# Patient Record
Sex: FEMALE | Race: WHITE | Employment: OTHER | URBAN - METROPOLITAN AREA
[De-identification: names, ages, dates, MRNs, and addresses within clinical notes are randomized per-mention and may not be internally consistent; named-entity substitution may affect disease eponyms.]

---

## 2019-10-07 ENCOUNTER — PROCEDURE VISIT (OUTPATIENT)
Dept: SURGERY | Age: 69
End: 2019-10-07
Payer: MEDICARE

## 2019-10-07 VITALS
OXYGEN SATURATION: 99 % | WEIGHT: 104 LBS | SYSTOLIC BLOOD PRESSURE: 111 MMHG | DIASTOLIC BLOOD PRESSURE: 62 MMHG | HEART RATE: 72 BPM | TEMPERATURE: 98.6 F

## 2019-10-07 DIAGNOSIS — C44.41 BASAL CELL CARCINOMA OF LEFT SIDE OF NECK: Primary | ICD-10-CM

## 2019-10-07 PROCEDURE — 17311 MOHS 1 STAGE H/N/HF/G: CPT | Performed by: DERMATOLOGY

## 2019-10-07 PROCEDURE — 12042 INTMD RPR N-HF/GENIT2.6-7.5: CPT | Performed by: DERMATOLOGY

## 2019-10-08 ENCOUNTER — TELEPHONE (OUTPATIENT)
Dept: SURGERY | Age: 69
End: 2019-10-08

## 2020-02-03 ENCOUNTER — PROCEDURE VISIT (OUTPATIENT)
Dept: SURGERY | Age: 70
End: 2020-02-03
Payer: MEDICARE

## 2020-02-03 VITALS
WEIGHT: 104 LBS | HEART RATE: 86 BPM | DIASTOLIC BLOOD PRESSURE: 71 MMHG | TEMPERATURE: 97.8 F | OXYGEN SATURATION: 100 % | SYSTOLIC BLOOD PRESSURE: 111 MMHG

## 2020-02-03 PROCEDURE — 17311 MOHS 1 STAGE H/N/HF/G: CPT | Performed by: DERMATOLOGY

## 2020-02-03 PROCEDURE — 12052 INTMD RPR FACE/MM 2.6-5.0 CM: CPT | Performed by: DERMATOLOGY

## 2020-02-03 NOTE — PATIENT INSTRUCTIONS
[facility] Office Hours:    Monday-Thursday  7:30 AM-4:30 PM    Friday  9:00 AM-3:00 PM    POST-OPERATIVE CARE FOR DISSOLVING STICHES             Bandage change after 48 hours    During your procedure today, dissolving sutures, or stiches, were used to close the wound. You do not have to have stiches removed. They will dissolve (melt away) on their own. Please follow these instructions to help you recover from your procedure and help your wound heal.    CARING FOR YOUR SURGICAL SITE  The bandage should remain on and completley dry for 48 hours. Do NOT get the bandage wet. 1. After the first 48 hours, gently remove the remaining part of the bandage. It can be helpful to moisten the bandage edges in the shower. Steri strips may still be on the wound. It is ok, they will fall off slowly with the daily bandage changes. 2. Gently clean AROUND the wound daily with mild soap and water. Please avoid the area from getting wet. The more moisture is on the sutures the quicker they will dissolve. 3. Dry (pat) the area with a clean Q-tip or gauze. Try to clean off any debris or crust from the area. 4. Apply a layer of Vaseline/ Aquaphor (or Bacitracin if your doctor recommends) to the wound area only. 5. Cut a piece of Telfa (or any non-stick dressing) to fit just over the wound and secure it with paper tape. If the wound is small you may use a Band- Aid. Keep area covered for a total of 1 week(s). If the dressing comes off or if you have questions, or concerns about the dressing, please call the office for instructions! POST OPERATIVE INSTRUCTIONS    1. Activity: Do not lift anything heavier than a gallon of milk for 1 week. Also, avoid strenuous activity such as running, power walking or contact sports. 2. Eating and drinking: Do not drink alcohol for 48 hours after your procedure. Alcohol increases the chances of bleeding.   3. Medicines   -If you have discomfort, take Acetaminophen (Tylenol effective. Sunscreen IS necessary. Use at least and SPF 30 sunscreen daily- even in winter    Call us at [phone] right away if you have any of the following symptoms:  -Bleeding that you can not stop (see highlighted area above)   -Pain that lasts longer than 48 hours  -Your wound becomes  more painful, red or hot  -Bruising and swelling that does not begin to improve within the 48 hours or gets worse suddenly.

## 2020-02-04 ENCOUNTER — TELEPHONE (OUTPATIENT)
Dept: SURGERY | Age: 70
End: 2020-02-04

## 2020-02-04 NOTE — TELEPHONE ENCOUNTER
The patient was in the office on 2/3/20 for mohs surgery located on the right FH with [facility] repair. The patient tolerated the procedure well and left the office in good condition. Pain level on post-operative day 1:  none and level of pain (1-10, 10 severe), 0/10    Any bleeding episode that required pressure to be held, bandage change or a call to the office or MD?  no     Any other issues?:  no    A post-operative telephone call was placed at [phone] in order to check on the patient's recovery process. The patient reported doing well and had no complaints other than those listed above, if any. All of the patient's questions were answered.

## 2023-03-12 ENCOUNTER — HOSPITAL ENCOUNTER (EMERGENCY)
Age: 73
Discharge: HOME | End: 2023-03-12
Attending: EMERGENCY MEDICINE
Payer: MEDICARE

## 2023-03-12 ENCOUNTER — APPOINTMENT (OUTPATIENT)
Dept: CT IMAGING | Age: 73
End: 2023-03-12
Payer: MEDICARE

## 2023-03-12 ENCOUNTER — APPOINTMENT (OUTPATIENT)
Dept: GENERAL RADIOLOGY | Age: 73
End: 2023-03-12
Payer: MEDICARE

## 2023-03-12 VITALS
OXYGEN SATURATION: 100 % | SYSTOLIC BLOOD PRESSURE: 118 MMHG | WEIGHT: 103.4 LBS | HEIGHT: 64 IN | TEMPERATURE: 99 F | DIASTOLIC BLOOD PRESSURE: 65 MMHG | HEART RATE: 71 BPM | RESPIRATION RATE: 17 BRPM | BODY MASS INDEX: 17.65 KG/M2

## 2023-03-12 DIAGNOSIS — R07.9 CHEST PAIN, UNSPECIFIED TYPE: Primary | ICD-10-CM

## 2023-03-12 LAB
A/G RATIO: 1.8 (ref 1.1–2.2)
ALBUMIN SERPL-MCNC: 4.1 G/DL (ref 3.4–5)
ALP BLD-CCNC: 80 U/L (ref 40–129)
ALT SERPL-CCNC: 15 U/L (ref 10–40)
ANION GAP SERPL CALCULATED.3IONS-SCNC: 9 MMOL/L (ref 3–16)
AST SERPL-CCNC: 26 U/L (ref 15–37)
BASOPHILS ABSOLUTE: 0 K/UL (ref 0–0.2)
BASOPHILS RELATIVE PERCENT: 0.8 %
BILIRUB SERPL-MCNC: <0.2 MG/DL (ref 0–1)
BILIRUBIN URINE: NEGATIVE
BLOOD, URINE: ABNORMAL
BUN BLDV-MCNC: 16 MG/DL (ref 7–20)
CALCIUM SERPL-MCNC: 9.4 MG/DL (ref 8.3–10.6)
CHLORIDE BLD-SCNC: 104 MMOL/L (ref 99–110)
CLARITY: CLEAR
CO2: 27 MMOL/L (ref 21–32)
COLOR: YELLOW
CREAT SERPL-MCNC: 0.8 MG/DL (ref 0.6–1.2)
EOSINOPHILS ABSOLUTE: 0 K/UL (ref 0–0.6)
EOSINOPHILS RELATIVE PERCENT: 0.6 %
EPITHELIAL CELLS, UA: ABNORMAL /HPF (ref 0–5)
GFR SERPL CREATININE-BSD FRML MDRD: >60 ML/MIN/{1.73_M2}
GLUCOSE BLD-MCNC: 95 MG/DL (ref 70–99)
GLUCOSE URINE: NEGATIVE MG/DL
HCT VFR BLD CALC: 38.6 % (ref 36–48)
HEMOGLOBIN: 12.9 G/DL (ref 12–16)
KETONES, URINE: NEGATIVE MG/DL
LEUKOCYTE ESTERASE, URINE: NEGATIVE
LYMPHOCYTES ABSOLUTE: 1.9 K/UL (ref 1–5.1)
LYMPHOCYTES RELATIVE PERCENT: 36.1 %
MCH RBC QN AUTO: 30.2 PG (ref 26–34)
MCHC RBC AUTO-ENTMCNC: 33.4 G/DL (ref 31–36)
MCV RBC AUTO: 90.4 FL (ref 80–100)
MICROSCOPIC EXAMINATION: YES
MONOCYTES ABSOLUTE: 0.4 K/UL (ref 0–1.3)
MONOCYTES RELATIVE PERCENT: 7.9 %
MUCUS: ABNORMAL /LPF
NEUTROPHILS ABSOLUTE: 2.9 K/UL (ref 1.7–7.7)
NEUTROPHILS RELATIVE PERCENT: 54.6 %
NITRITE, URINE: NEGATIVE
PDW BLD-RTO: 13.6 % (ref 12.4–15.4)
PH UA: 6 (ref 5–8)
PLATELET # BLD: 283 K/UL (ref 135–450)
PMV BLD AUTO: 7.6 FL (ref 5–10.5)
POTASSIUM REFLEX MAGNESIUM: 4 MMOL/L (ref 3.5–5.1)
PROTEIN UA: NEGATIVE MG/DL
RBC # BLD: 4.27 M/UL (ref 4–5.2)
RBC UA: ABNORMAL /HPF (ref 0–4)
SODIUM BLD-SCNC: 140 MMOL/L (ref 136–145)
SPECIFIC GRAVITY UA: 1.02 (ref 1–1.03)
TOTAL PROTEIN: 6.4 G/DL (ref 6.4–8.2)
TROPONIN: <0.01 NG/ML
URINE REFLEX TO CULTURE: ABNORMAL
URINE TYPE: ABNORMAL
UROBILINOGEN, URINE: 0.2 E.U./DL
WBC # BLD: 5.4 K/UL (ref 4–11)
WBC UA: ABNORMAL /HPF (ref 0–5)

## 2023-03-12 PROCEDURE — 81001 URINALYSIS AUTO W/SCOPE: CPT

## 2023-03-12 PROCEDURE — 71046 X-RAY EXAM CHEST 2 VIEWS: CPT

## 2023-03-12 PROCEDURE — 36415 COLL VENOUS BLD VENIPUNCTURE: CPT

## 2023-03-12 PROCEDURE — 93005 ELECTROCARDIOGRAM TRACING: CPT | Performed by: EMERGENCY MEDICINE

## 2023-03-12 PROCEDURE — 80053 COMPREHEN METABOLIC PANEL: CPT

## 2023-03-12 PROCEDURE — 85025 COMPLETE CBC W/AUTO DIFF WBC: CPT

## 2023-03-12 PROCEDURE — 70450 CT HEAD/BRAIN W/O DYE: CPT

## 2023-03-12 PROCEDURE — 99284 EMERGENCY DEPT VISIT MOD MDM: CPT

## 2023-03-12 PROCEDURE — 84484 ASSAY OF TROPONIN QUANT: CPT

## 2023-03-12 ASSESSMENT — PAIN DESCRIPTION - LOCATION: LOCATION: CHEST

## 2023-03-12 ASSESSMENT — PAIN SCALES - GENERAL: PAINLEVEL_OUTOF10: 3

## 2023-03-12 ASSESSMENT — PAIN DESCRIPTION - DESCRIPTORS: DESCRIPTORS: DULL;DISCOMFORT

## 2023-03-12 NOTE — DISCHARGE INSTRUCTIONS
Today, you are seen for chest pain. Your blood work was all reassuring. Since you have been having some fuzzy feeling in your head, we also decided to do a CAT scan which did not show any abnormalities. I am going to have you follow-up with your primary care physician and with cardiology. Please return to the emergency department immediately if you have any new or worsening symptoms. This will include worsening chest pain, shortness of breath, epigastric discomfort.

## 2023-03-12 NOTE — ED PROVIDER NOTES
[address] [facility]          ATTENDING PHYSICIAN NOTE       Date of evaluation: 3/12/2023    Chief Complaint     Chest Pain (For about 15 minutes last night accompanied by some dizziness, today c/o fuzzy feeling in head and slight headache)      History of Present Illness     Milana Ramos is a 68 y.o. female with a past medical history of diverticulitis, asthma, IBS who presents to the emergency department today with chest pain and lightheadedness. Yesterday, she was standing up when she had a sharp pain in her left chest that lasted for approximately 15 minutes. She also felt lightheaded for couple of minutes before that resolved. Her symptoms improved when she laid down. Today, she has a new headache in her occipital region, along with a \"fuzzy feeling\" in her head. She has been eating and drinking normally. She is voiding normally and having normal bowel movements. No shortness of breath. No diaphoresis. No photo or phonophobia. Review of Systems     As documented in HPI, otherwise all other systems were reviewed and negative    Past Medical, Surgical, Family, and Social History     She has no past medical history on file. She has a past surgical history that includes Mohs surgery (Right, 02/03/2020). Her family history is not on file. She reports that she has never smoked. She has never used smokeless tobacco. She reports that she does not use drugs. Medications     Previous Medications    CALCIUM-VITAMIN D-VITAMIN K 500-100-40 MG-UNT-MCG CHEW    Take by mouth    MULTIPLE VITAMINS-MINERALS (MULTIVITAMIN PO)    Take by mouth       Allergies     She has No Known Allergies.     Physical Exam     INITIAL VITALS: BP: (!) 140/64, Temp: 99 °F (37.2 °C), Heart Rate: 72, Resp: 18, SpO2: 100 %   General: Well-appearing, no acute distress  HEENT: head is atraumatic, pupils equal round and reactive to light, sclera are clear  Neck: Supple  Chest: Nonlabored respirations, equal chest rise bilaterally, no accessory muscle use  Cardiovascular: Normal rate, regular rhythm, 2+ radial pulses bilaterally  Abdominal: Soft, nontender, nondistended, no rebound or guarding  Back: No CVA tenderness bilaterally  Skin: Warm, dry, well-perfused, no rashes  Musculoskeletal: No obvious deformities, no tenderness to palpation diffusely  Neurologic: Alert and oriented x4, speech is clear and intact without dysarthria. Cranial nerves II through XII are intact. Finger-nose, rapid alternating movements, heel-to-shin are intact. Muscle strength is 5/5 in bilateral upper and lower extremities. Sensation is intact in bilateral upper and lower extremities. Romberg is negative. Psych: Appropriate mood and affect  Diagnostic Results     EKG   Twelve-lead EKG performed on 3/12/2023 at 1428 hrs. Sinus rhythm with a first-degree heart block at a rate of 72. Normal axis. Good R wave progression. No significant Q waves noted. No ST segment changes noted. No T wave inversions noted. QTc normal at 431. QRS normal 80. IL interval prolonged at 222. No STEMI. RADIOLOGY:  CT Head W/O Contrast   Final Result   1. No acute intracranial abnormality. XR CHEST (2 VW)   Final Result   1. No acute disease.           LABS:   Results for orders placed or performed during the hospital encounter of 03/12/23   CBC with Auto Differential   Result Value Ref Range    WBC 5.4 4.0 - 11.0 K/uL    RBC 4.27 4.00 - 5.20 M/uL    Hemoglobin 12.9 12.0 - 16.0 g/dL    Hematocrit 38.6 36.0 - 48.0 %    MCV 90.4 80.0 - 100.0 fL    MCH 30.2 26.0 - 34.0 pg    MCHC 33.4 31.0 - 36.0 g/dL    RDW 13.6 12.4 - 15.4 %    Platelets 637 131 - 155 K/uL    MPV 7.6 5.0 - 10.5 fL    Neutrophils % 54.6 %    Lymphocytes % 36.1 %    Monocytes % 7.9 %    Eosinophils % 0.6 %    Basophils % 0.8 %    Neutrophils Absolute 2.9 1.7 - 7.7 K/uL    Lymphocytes Absolute 1.9 1.0 - 5.1 K/uL    Monocytes Absolute 0.4 0.0 - 1.3 K/uL    Eosinophils Absolute 0.0 0.0 - 0.6 K/uL    Basophils Absolute 0.0 0.0 - 0.2 K/uL   CMP w/ Reflex to MG   Result Value Ref Range    Sodium 140 136 - 145 mmol/L    Potassium reflex Magnesium 4.0 3.5 - 5.1 mmol/L    Chloride 104 99 - 110 mmol/L    CO2 27 21 - 32 mmol/L    Anion Gap 9 3 - 16    Glucose 95 70 - 99 mg/dL    BUN 16 7 - 20 mg/dL    Creatinine 0.8 0.6 - 1.2 mg/dL    Est, Glom Filt Rate >60 >60    Calcium 9.4 8.3 - 10.6 mg/dL    Total Protein 6.4 6.4 - 8.2 g/dL    Albumin 4.1 3.4 - 5.0 g/dL    Albumin/Globulin Ratio 1.8 1.1 - 2.2    Total Bilirubin <0.2 0.0 - 1.0 mg/dL    Alkaline Phosphatase 80 40 - 129 U/L    ALT 15 10 - 40 U/L    AST 26 15 - 37 U/L   Troponin   Result Value Ref Range    Troponin <0.01 <0.01 ng/mL   Urinalysis with Reflex to Culture    Specimen: Urine   Result Value Ref Range    Color, UA Yellow Straw/Yellow    Clarity, UA Clear Clear    Glucose, Ur Negative Negative mg/dL    Bilirubin Urine Negative Negative    Ketones, Urine Negative Negative mg/dL    Specific Gravity, UA 1.025 1.005 - 1.030    Blood, Urine TRACE-INTACT (A) Negative    pH, UA 6.0 5.0 - 8.0    Protein, UA Negative Negative mg/dL    Urobilinogen, Urine 0.2 <2.0 E.U./dL    Nitrite, Urine Negative Negative    Leukocyte Esterase, Urine Negative Negative    Microscopic Examination YES     Urine Type NotGiven     Urine Reflex to Culture Not Indicated    Microscopic Urinalysis   Result Value Ref Range    Mucus, UA Rare (A) None Seen /LPF    WBC, UA 0-2 0 - 5 /HPF    RBC, UA 5-10 (A) 0 - 4 /HPF    Epithelial Cells, UA 0-1 0 - 5 /HPF       ED BEDSIDE ULTRASOUND:  No results found. RECENT VITALS:  BP: 118/65,Temp: 99 °F (37.2 °C), Heart Rate: 71, Resp: 17, SpO2: 100 %     Procedures     N/A    ED Course     Nursing Notes, Past Medical Hx, Past Surgical Hx, Social Hx,Allergies, and Family Hx were reviewed. patient was given the following medications:  No orders of the defined types were placed in this encounter.       CONSULTS:  None    MEDICAL DECISIONMAKING / ASSESSMENT / Humairavilla Dykes  Maria Hanley is a 68 y.o. female with a past medical history of diverticulitis, asthma, IBS who presents to the emergency department today with chest pain and lightheadedness. She arrives to the emergency department her ABCs intact. She is hypertensive, vitals are otherwise within normal limits. Her exam is reassuring. Neuro exam is reassuring. EKG negative for STEMI or arrhythmia. CT of the head/brain without any acute abnormalities. X-ray of the chest without acute abnormalities. Her blood work, including troponin is all reassuring. UA is negative for UTI. On repeat evaluation, she feels better. She does not have any pain currently. She has a heart score of 3. Due to her work-up today, I think she can be safely discharged home. She should follow-up with her primary care physician and cardiology. I have given her the information for this. She can return to the emergency department immediately with new or worsening symptoms. I discussed this with the patient who verbalized understanding and agreement to the plan. She is discharged from the emergency department in stable condition. Amount and/or Complexity of Data Reviewed  External Data Reviewed: notes. Labs: ordered. Decision-making details documented in ED Course. Radiology: ordered. Decision-making details documented in ED Course. ECG/medicine tests: ordered and independent interpretation performed. Decision-making details documented in ED Course. Clinical Impression     1. Chest pain, unspecified type        Disposition     PATIENT REFERRED TO:  [id number]  [place] [address] [phone]    Schedule an appointment as soon as possible for a visit       Noel Armstrongiredo [facility]  [address]  [address] [address] [facility]  Schedule an appointment as soon as possible for a visit       DISCHARGE MEDICATIONS:  New Prescriptions    No medications on file       DISPOSITION Decision To Discharge 03/12/2023 06:23:03 PM         aKylah Carter MD  03/12/23 5278

## 2023-03-12 NOTE — ED NOTES
Patient discharged to home via family. Written discharge instructions reviewed with understanding. Copy of AVS sent home with patient. Patient able to walk from ED without assistance.         Lorrie Fisher RN  03/12/23 5604

## 2023-03-13 LAB
EKG ATRIAL RATE: 72 BPM
EKG DIAGNOSIS: NORMAL
EKG P AXIS: 83 DEGREES
EKG P-R INTERVAL: 222 MS
EKG Q-T INTERVAL: 394 MS
EKG QRS DURATION: 80 MS
EKG QTC CALCULATION (BAZETT): 431 MS
EKG R AXIS: 86 DEGREES
EKG T AXIS: 70 DEGREES
EKG VENTRICULAR RATE: 72 BPM

## 2023-03-16 NOTE — PROGRESS NOTES
[facility]     Outpatient Cardiology         Patient Name:  Prisca Board  Requesting Physician: No admitting provider for patient encounter. Primary Care Physician: Hal Barrett    Reason for Consultation/Chief Complaint:   Chief Complaint   Patient presents with    Chest Pain     More below the breast across    Fatigue       HPI:     68year old female seen in [place] [address] ED 3/12/23 for chest pain presents for cardiac evaluation. She did not present to the ER until the day after she experiences chest pain. When she stood up she got warm and lightheaded and almost blacked out. Chest pain lasted for 15-20 minutes and then the pain gradually subsided. She has new c/o fatigue, headache, ringing in her ears, and states she feels\"foggy\". She said these symptoms also began around the same time she began experiencing chest pain. Histories:     Past Medical History:   has no past medical history on file. Surgical History:   has a past surgical history that includes Mohs surgery (Right, 02/03/2020). Social History:   reports that she has never smoked. She has never used smokeless tobacco. She reports that she does not use drugs. Family History:  No evidence for sudden cardiac death or premature CAD    Medications:     Home Medications:  Were reviewed and are listed in nursing record. and/or listed below    Prior to Admission medications    Medication Sig Start Date End Date Taking? Authorizing Provider   Multiple Vitamins-Minerals (MULTIVITAMIN PO) Take by mouth   Yes Historical Provider, MD   Calcium-Vitamin D-Vitamin K 500-100-40 MG-UNT-MCG CHEW Take by mouth   Yes Historical Provider, MD        Allergy:     Patient has no known allergies.      Review of Systems:     Review of Systems    Physical Examination:     Vitals:    03/17/23 0945   BP: 132/74   Site: Right Upper Arm   Position: Sitting   Cuff Size: Medium Adult   Pulse: 54   Weight: 100 lb 9.6 oz (45.6 kg)   Height: 5' 4\" (1.626 m)       Wt Readings from Last 3 Encounters:   03/17/23 100 lb 9.6 oz (45.6 kg)   03/12/23 103 lb 6.3 oz (46.9 kg)   02/03/20 104 lb (47.2 kg)       Physical Exam  Constitutional:       Appearance: Normal appearance. HENT:      Head: Normocephalic and atraumatic. Nose: Nose normal.   Eyes:      Conjunctiva/sclera: Conjunctivae normal.   Cardiovascular:      Rate and Rhythm: Normal rate and regular rhythm. Heart sounds: Normal heart sounds. Pulmonary:      Effort: Pulmonary effort is normal.      Breath sounds: Normal breath sounds. Abdominal:      Palpations: Abdomen is soft. Musculoskeletal:      Cervical back: Neck supple. Skin:     General: Skin is warm and dry. Neurological:      General: No focal deficit present. Mental Status: She is alert.          Labs:     Lab Results   Component Value Date    WBC 5.4 03/12/2023    HGB 12.9 03/12/2023    HCT 38.6 03/12/2023    MCV 90.4 03/12/2023     03/12/2023     Lab Results   Component Value Date     03/12/2023    K 4.0 03/12/2023     03/12/2023    CO2 27 03/12/2023    BUN 16 03/12/2023    CREATININE 0.8 03/12/2023    GLUCOSE 95 03/12/2023    CALCIUM 9.4 03/12/2023    PROT 6.4 03/12/2023    LABALBU 4.1 03/12/2023    BILITOT <0.2 03/12/2023    ALKPHOS 80 03/12/2023    AST 26 03/12/2023    ALT 15 03/12/2023    LABGLOM >60 03/12/2023    AGRATIO 1.8 03/12/2023         No results found for: CHOL  No results found for: TRIG  No results found for: HDL  No results found for: LDLCHOLESTEROL, LDLCALC  No results found for: LABVLDL, VLDL  No results found for: CHOLHDLRATIO    No results found for: INR, PROTIME    The ASCVD Risk score ([place] DK, et al., 2019) failed to calculate for the following reasons:    Cannot find a previous HDL lab    Cannot find a previous total cholesterol lab      Imaging:       ECG (if available, Personally interpreted):    Last Monitor/Holter (if available):    Last Stress (if available):    Last Cath (if available):    Last TTE/SUZI(if available):    Last CMR  (if available):    Last Coronary Artery Calcium Score: Ankle-brachial index:    Carotid ultrasound screening:    Abdominal aortic aneurysm screening:    Assessment / Plan:     1. Chest pain, unspecified type    2. Dizziness    3. Fatigue, unspecified type      Discussed lifestyle modifications. Echocardiogram to exclude significant structural abnormalities and noninvasive ischemic assessment. 7-day CAM to evaluate for significant arrhythmias. And to follow-up with PCP for ringing in her ears, headaches and fogginess  RTC 1 month      Orders Placed This Encounter   Procedures    Cardiac event monitor    Stress test, myoview    EKG 12 Lead    Echo 2D w doppler w color complete        No follow-ups on file. The note was completed using EMR and Dragon dictation system. Every effort was made to ensure accuracy; however, inadvertent computerized transcription errors may be present. All questions and concerns were addressed to the patient. I would like to thank you for providing me the opportunity to participate in the care of your patient. If you have any questions, please do not hesitate to contact me. Magdi Hernandez MD, [facility] [address] [place] [address]  Main Office Phone: [phone]  Fax: [phone]    I, Rashmi Lockett RN, am scribing for and in the presence of Dr. Magdi Hernandez. 03/17/23 10:53 AM  Rashmi Lockett RN    Physician Attestation:  The scribes documentation has been prepared under my direction and personally reviewed by me in its entirety. I confirm the note above accurately reflects all work, treatment, procedures, and medical decision making performed by me.     Electronically signed by Magdi Hernandez MD on 3/17/2023 at 10:53 AM

## 2023-03-17 ENCOUNTER — OFFICE VISIT (OUTPATIENT)
Dept: CARDIOLOGY CLINIC | Age: 73
End: 2023-03-17

## 2023-03-17 VITALS
HEIGHT: 64 IN | DIASTOLIC BLOOD PRESSURE: 74 MMHG | WEIGHT: 100.6 LBS | SYSTOLIC BLOOD PRESSURE: 132 MMHG | BODY MASS INDEX: 17.17 KG/M2 | HEART RATE: 54 BPM

## 2023-03-17 DIAGNOSIS — R53.83 FATIGUE, UNSPECIFIED TYPE: ICD-10-CM

## 2023-03-17 DIAGNOSIS — R07.9 CHEST PAIN, UNSPECIFIED TYPE: Primary | ICD-10-CM

## 2023-03-17 DIAGNOSIS — R42 DIZZINESS: ICD-10-CM

## 2023-03-17 PROCEDURE — 93242 EXT ECG>48HR<7D RECORDING: CPT | Performed by: INTERNAL MEDICINE

## 2023-03-17 NOTE — PATIENT INSTRUCTIONS
Schedule Echocardiogram and Stress Test,  Wear you 7 day CAM - return to office when completed   Change positions slowly to help prevent passing out  Follow up in 1 month

## 2023-03-31 ENCOUNTER — HOSPITAL ENCOUNTER (OUTPATIENT)
Dept: NON INVASIVE DIAGNOSTICS | Age: 73
Discharge: HOME | End: 2023-03-31
Payer: MEDICARE

## 2023-03-31 DIAGNOSIS — R42 DIZZINESS: ICD-10-CM

## 2023-03-31 DIAGNOSIS — R07.9 CHEST PAIN, UNSPECIFIED TYPE: ICD-10-CM

## 2023-03-31 DIAGNOSIS — R53.83 FATIGUE, UNSPECIFIED TYPE: ICD-10-CM

## 2023-03-31 PROCEDURE — 3430000000 HC RX DIAGNOSTIC RADIOPHARMACEUTICAL: Performed by: INTERNAL MEDICINE

## 2023-03-31 PROCEDURE — A9502 TC99M TETROFOSMIN: HCPCS | Performed by: INTERNAL MEDICINE

## 2023-03-31 PROCEDURE — 78452 HT MUSCLE IMAGE SPECT MULT: CPT

## 2023-03-31 RX ADMIN — TETROFOSMIN 30 MILLICURIE: 1.38 INJECTION, POWDER, LYOPHILIZED, FOR SOLUTION INTRAVENOUS at 14:40

## 2023-03-31 RX ADMIN — TETROFOSMIN 10 MILLICURIE: 1.38 INJECTION, POWDER, LYOPHILIZED, FOR SOLUTION INTRAVENOUS at 13:10

## 2023-04-04 PROCEDURE — 93244 EXT ECG>48HR<7D REV&INTERPJ: CPT | Performed by: INTERNAL MEDICINE

## 2023-04-05 ENCOUNTER — TELEPHONE (OUTPATIENT)
Dept: CARDIOLOGY CLINIC | Age: 73
End: 2023-04-05

## 2023-04-05 DIAGNOSIS — R42 DIZZINESS: ICD-10-CM

## 2023-04-05 DIAGNOSIS — R00.2 PALPITATION: Primary | ICD-10-CM

## 2023-04-05 DIAGNOSIS — R53.83 FATIGUE, UNSPECIFIED TYPE: ICD-10-CM

## 2023-04-05 DIAGNOSIS — R07.9 CHEST PAIN, UNSPECIFIED TYPE: ICD-10-CM

## 2023-04-05 NOTE — TELEPHONE ENCOUNTER
Patient returned a missed call from someone pertaining to stress test results.   Call back [phone]

## 2023-04-14 PROBLEM — R53.83 FATIGUE: Status: ACTIVE | Noted: 2023-04-14

## 2023-04-14 PROBLEM — R42 DIZZINESS: Status: ACTIVE | Noted: 2023-04-14

## 2023-04-14 PROBLEM — R07.9 CHEST PAIN: Status: ACTIVE | Noted: 2023-04-14

## 2024-05-08 ENCOUNTER — HOSPITAL ENCOUNTER (EMERGENCY)
Age: 74
Discharge: HOME | End: 2024-05-08
Attending: EMERGENCY MEDICINE
Payer: MEDICARE

## 2024-05-08 ENCOUNTER — APPOINTMENT (OUTPATIENT)
Dept: GENERAL RADIOLOGY | Age: 74
End: 2024-05-08
Payer: MEDICARE

## 2024-05-08 VITALS
DIASTOLIC BLOOD PRESSURE: 67 MMHG | OXYGEN SATURATION: 95 % | BODY MASS INDEX: 22.23 KG/M2 | SYSTOLIC BLOOD PRESSURE: 102 MMHG | RESPIRATION RATE: 20 BRPM | TEMPERATURE: 98.4 F | WEIGHT: 130.2 LBS | HEART RATE: 100 BPM | HEIGHT: 64 IN

## 2024-05-08 DIAGNOSIS — J18.9 COMMUNITY ACQUIRED PNEUMONIA OF RIGHT LOWER LOBE OF LUNG: Primary | ICD-10-CM

## 2024-05-08 PROBLEM — R05.3 CHRONIC COUGH: Status: ACTIVE | Noted: 2024-03-18

## 2024-05-08 PROBLEM — L90.5 SCAR CONDITIONS AND FIBROSIS OF SKIN: Status: ACTIVE | Noted: 2021-09-27

## 2024-05-08 PROBLEM — K58.9 IRRITABLE BOWEL SYNDROME: Status: ACTIVE | Noted: 2024-05-08

## 2024-05-08 PROBLEM — E55.9 VITAMIN D DEFICIENCY: Status: ACTIVE | Noted: 2024-05-08

## 2024-05-08 LAB
ANION GAP SERPL CALCULATED.3IONS-SCNC: 10 MMOL/L (ref 3–16)
BASE EXCESS BLDV CALC-SCNC: 4.6 MMOL/L (ref -2–3)
BASOPHILS # BLD: 0.1 K/UL (ref 0–0.2)
BASOPHILS NFR BLD: 0.5 %
BUN SERPL-MCNC: 9 MG/DL (ref 7–20)
CALCIUM SERPL-MCNC: 9.1 MG/DL (ref 8.3–10.6)
CHLORIDE SERPL-SCNC: 99 MMOL/L (ref 99–110)
CO2 BLDV-SCNC: 31 MMOL/L
CO2 SERPL-SCNC: 26 MMOL/L (ref 21–32)
COHGB MFR BLDV: 1.1 % (ref 0–1.5)
CREAT SERPL-MCNC: 0.7 MG/DL (ref 0.6–1.2)
DEPRECATED RDW RBC AUTO: 14.2 % (ref 12.4–15.4)
DO-HGB MFR BLDV: 62.5 %
EKG ATRIAL RATE: 90 BPM
EKG DIAGNOSIS: NORMAL
EKG P AXIS: 71 DEGREES
EKG P-R INTERVAL: 152 MS
EKG Q-T INTERVAL: 348 MS
EKG QRS DURATION: 80 MS
EKG QTC CALCULATION (BAZETT): 425 MS
EKG R AXIS: 78 DEGREES
EKG T AXIS: 55 DEGREES
EKG VENTRICULAR RATE: 90 BPM
EOSINOPHIL # BLD: 0 K/UL (ref 0–0.6)
EOSINOPHIL NFR BLD: 0.2 %
GFR SERPLBLD CREATININE-BSD FMLA CKD-EPI: >90 ML/MIN/{1.73_M2}
GLUCOSE SERPL-MCNC: 119 MG/DL (ref 70–99)
HCO3 BLDV-SCNC: 29.5 MMOL/L (ref 24–28)
HCT VFR BLD AUTO: 31 % (ref 36–48)
HGB BLD-MCNC: 10.2 G/DL (ref 12–16)
LYMPHOCYTES # BLD: 1.5 K/UL (ref 1–5.1)
LYMPHOCYTES NFR BLD: 11.2 %
MAGNESIUM SERPL-MCNC: 2.4 MG/DL (ref 1.8–2.4)
MCH RBC QN AUTO: 29.1 PG (ref 26–34)
MCHC RBC AUTO-ENTMCNC: 33 G/DL (ref 31–36)
MCV RBC AUTO: 88.3 FL (ref 80–100)
METHGB MFR BLDV: 0.1 % (ref 0–1.5)
MONOCYTES # BLD: 0.6 K/UL (ref 0–1.3)
MONOCYTES NFR BLD: 4.3 %
NEUTROPHILS # BLD: 10.9 K/UL (ref 1.7–7.7)
NEUTROPHILS NFR BLD: 83.8 %
PCO2 BLDV: 44.7 MMHG (ref 41–51)
PH BLDV: 7.43 [PH] (ref 7.35–7.45)
PLATELET # BLD AUTO: 414 K/UL (ref 135–450)
PMV BLD AUTO: 7.8 FL (ref 5–10.5)
PO2 BLDV: <30 MMHG (ref 25–40)
POTASSIUM SERPL-SCNC: 4.2 MMOL/L (ref 3.5–5.1)
PROCALCITONIN SERPL IA-MCNC: 0.27 NG/ML (ref 0–0.15)
RBC # BLD AUTO: 3.51 M/UL (ref 4–5.2)
SAO2 % BLDV: 37 %
SODIUM SERPL-SCNC: 135 MMOL/L (ref 136–145)
WBC # BLD AUTO: 13.1 K/UL (ref 4–11)

## 2024-05-08 PROCEDURE — 80048 BASIC METABOLIC PNL TOTAL CA: CPT

## 2024-05-08 PROCEDURE — 93005 ELECTROCARDIOGRAM TRACING: CPT | Performed by: EMERGENCY MEDICINE

## 2024-05-08 PROCEDURE — 96374 THER/PROPH/DIAG INJ IV PUSH: CPT

## 2024-05-08 PROCEDURE — 82803 BLOOD GASES ANY COMBINATION: CPT

## 2024-05-08 PROCEDURE — 99285 EMERGENCY DEPT VISIT HI MDM: CPT

## 2024-05-08 PROCEDURE — 2580000003 HC RX 258: Performed by: PHYSICIAN ASSISTANT

## 2024-05-08 PROCEDURE — 6370000000 HC RX 637 (ALT 250 FOR IP): Performed by: PHYSICIAN ASSISTANT

## 2024-05-08 PROCEDURE — 84145 PROCALCITONIN (PCT): CPT

## 2024-05-08 PROCEDURE — 85025 COMPLETE CBC W/AUTO DIFF WBC: CPT

## 2024-05-08 PROCEDURE — 6360000002 HC RX W HCPCS: Performed by: PHYSICIAN ASSISTANT

## 2024-05-08 PROCEDURE — 83735 ASSAY OF MAGNESIUM: CPT

## 2024-05-08 PROCEDURE — 71045 X-RAY EXAM CHEST 1 VIEW: CPT

## 2024-05-08 RX ORDER — BENZONATATE 200 MG/1
200 CAPSULE ORAL 3 TIMES DAILY PRN
Qty: 45 CAPSULE | Refills: 0 | Status: SHIPPED | OUTPATIENT
Start: 2024-05-08

## 2024-05-08 RX ORDER — BENZONATATE 100 MG/1
200 CAPSULE ORAL 3 TIMES DAILY PRN
Status: DISCONTINUED | OUTPATIENT
Start: 2024-05-08 | End: 2024-05-08 | Stop reason: HOSPADM

## 2024-05-08 RX ORDER — AZITHROMYCIN 250 MG/1
500 TABLET, FILM COATED ORAL ONCE
Status: COMPLETED | OUTPATIENT
Start: 2024-05-08 | End: 2024-05-08

## 2024-05-08 RX ORDER — GUAIFENESIN/DEXTROMETHORPHAN 100-10MG/5
5 SYRUP ORAL EVERY 4 HOURS PRN
Status: DISCONTINUED | OUTPATIENT
Start: 2024-05-08 | End: 2024-05-08 | Stop reason: HOSPADM

## 2024-05-08 RX ORDER — AMOXICILLIN AND CLAVULANATE POTASSIUM 875; 125 MG/1; MG/1
1 TABLET, FILM COATED ORAL 2 TIMES DAILY
Qty: 20 TABLET | Refills: 0 | Status: SHIPPED | OUTPATIENT
Start: 2024-05-08 | End: 2024-05-18

## 2024-05-08 RX ORDER — DICLOFENAC SODIUM 75 MG/1
75 TABLET, DELAYED RELEASE ORAL 2 TIMES DAILY
COMMUNITY
Start: 2024-02-08

## 2024-05-08 RX ORDER — AZITHROMYCIN 250 MG/1
250 TABLET, FILM COATED ORAL DAILY
Qty: 4 TABLET | Refills: 0 | Status: SHIPPED | OUTPATIENT
Start: 2024-05-08 | End: 2024-05-12

## 2024-05-08 RX ORDER — AMOXICILLIN AND CLAVULANATE POTASSIUM 875; 125 MG/1; MG/1
1 TABLET, FILM COATED ORAL ONCE
Status: COMPLETED | OUTPATIENT
Start: 2024-05-08 | End: 2024-05-08

## 2024-05-08 RX ORDER — FAMOTIDINE 20 MG/1
20 TABLET, FILM COATED ORAL 2 TIMES DAILY
COMMUNITY
Start: 2023-08-07

## 2024-05-08 RX ORDER — FLUTICASONE FUROATE AND VILANTEROL 200; 25 UG/1; UG/1
1 POWDER RESPIRATORY (INHALATION) DAILY
COMMUNITY
Start: 2024-05-01

## 2024-05-08 RX ADMIN — BENZONATATE 200 MG: 100 CAPSULE ORAL at 02:00

## 2024-05-08 RX ADMIN — GUAIFENESIN SYRUP AND DEXTROMETHORPHAN 5 ML: 100; 10 SYRUP ORAL at 02:00

## 2024-05-08 RX ADMIN — WATER 125 MG: 1 INJECTION INTRAMUSCULAR; INTRAVENOUS; SUBCUTANEOUS at 02:07

## 2024-05-08 RX ADMIN — AZITHROMYCIN DIHYDRATE 500 MG: 250 TABLET, FILM COATED ORAL at 02:49

## 2024-05-08 RX ADMIN — AMOXICILLIN AND CLAVULANATE POTASSIUM 1 TABLET: 875; 125 TABLET, FILM COATED ORAL at 02:49

## 2024-05-08 ASSESSMENT — PAIN DESCRIPTION - LOCATION: LOCATION: RIB CAGE

## 2024-05-08 ASSESSMENT — LIFESTYLE VARIABLES
HOW MANY STANDARD DRINKS CONTAINING ALCOHOL DO YOU HAVE ON A TYPICAL DAY: PATIENT DOES NOT DRINK
HOW OFTEN DO YOU HAVE A DRINK CONTAINING ALCOHOL: NEVER

## 2024-05-08 ASSESSMENT — PAIN - FUNCTIONAL ASSESSMENT: PAIN_FUNCTIONAL_ASSESSMENT: 0-10

## 2024-05-08 ASSESSMENT — PAIN SCALES - GENERAL: PAINLEVEL_OUTOF10: 7

## 2024-05-08 NOTE — ED PROVIDER NOTES
ED Attending Attestation Note     Date of evaluation: 5/8/2024    This patient was seen by the advance practice provider.  I have seen and examined the patient, agree with the workup, evaluation, management and diagnosis. The care plan has been discussed.  I have reviewed the ECG and concur with the ALLISON's interpretation.  My assessment reveals a 74-year-old female who presents with a chief complaint of shortness of breath.  Patient with a chronic cough but worsening over the last few days.      General: This is a wd/wn female  in no acute distress who appears their stated age.     HEENT: NC/AT. PERRL. OP clear. MMM.     Neck: Supple. Trachea midline.    Pulmonary: some rhonchi that clear with coughing..    Cardiac: RRR    Abdomen: S/NT/ND/+BS. No cva tenderness.     Musculoskeletal: WWP with no clubbing, cyanosis, or deformities noted.     Vascular: +2 radial and DP pulses.     Skin: Warm and dry with no lesions noted    Neuro:  AAOx4.  Face is grossly symmetric.  Gait not assessed. Moving all 4 extremities equally and spontaneously       Consuelo Nolan MD  05/08/24 0312

## 2024-05-08 NOTE — DISCHARGE INSTRUCTIONS
Thank you for choosing [facility] for your emergency care today. We take a lot of pride in the fact that you chose us for your care and we strived to do everything necessary to provide you with excellent medical care. We hope you agree that you received excellent care today.    To continue that excellent medical care, the following information very important that you read and understand before you leave the emergency department today. It contains information about:  Your diagnosis  What was done for you in the emergency department  What you can expect from your illness or injury moving forward  The steps you will need to take after leaving the emergency department to help achieve the best possible outcome for your illness or injury.    What we did in the Emergency Department Today:     You were seen in the Emergency Department today by Jorden Vila PA-C.     You had the following lab abnormalities:  Labs Reviewed   CBC WITH AUTO DIFFERENTIAL - Abnormal; Notable for the following components:       Result Value    WBC 13.1 (*)     RBC 3.51 (*)     Hemoglobin 10.2 (*)     Hematocrit 31.0 (*)     Neutrophils Absolute 10.9 (*)     All other components within normal limits   BASIC METABOLIC PANEL - Abnormal; Notable for the following components:    Sodium 135 (*)     Glucose 119 (*)     All other components within normal limits   BLOOD GAS, VENOUS - Abnormal; Notable for the following components:    HCO3, Venous 29.5 (*)     Base Excess, Tanner 4.6 (*)     All other components within normal limits   MAGNESIUM   PROCALCITONIN       If no result appears for the test, then it was within normal limits. If the test is pending, the hospital will krishan you if the results are abnormal as soon as the test is completed.    You had the following diagnostic imaging:  XR CHEST PORTABLE   Final Result      Right basilar airspace density consistent with infiltrate.      No other acute  new clumsiness  New Seizures  Significant Head Injury  Eye Injuries or new vision changes  Severe Nausea and Vomiting that prevents you from eating, drinking and/or taking your medications  Significant or persistent fevers (Above 100.3 F in babies, over 104 F in adults, as an example)  Chest Pain  Shortness of Breath  Sudden, severe pain  Cuts that won't stop bleeding  Significant Cordero  Bleeding during Pregnancy in women  Testicular Pain in men    Your Plan of Care after leaving our Emergency Department     You should read the following instructions before leaving the Emergency Department. If you have any questions about this Plan of Care, please don't hesitate to ask. It is important that you understand this Plan of Care so that you can achieve the best possible outcome from your illness or injury.    IMPORTANT INSTRUCTIONS:    You may continue to take MUCINEX tablets for thick mucous secretions.    You may take DEXTROMETHOPHAN cough syrup every 4-6 hours as needed for cough in addition to the prescription cough capsules.    Call your pulmonologist to let them know you were seen in the ER and have evidence of pneumonia in your right lower lung. Let them know you were started on antibiotics and ask them if this will impair your ability to get your Pulmonary Function Test on Thursday.    You should follow-up with:  Jeff Saleh MD  [address]  [facility] [id number] [phone]    Schedule an appointment as soon as possible for a visit       [facility]  [address] [place] [id number] [phone]  Go to   If symptoms worsen      You should call the number of the providers listed above to schedule follow-up appointments in the timeline recommended or to speak to a healthcare provider. These providers also have on-call clinicians available after hours and on weekends for urgent questions and can be reached by calling the same number. If you feel your

## 2024-05-08 NOTE — ED NOTES
Patient prepared for and ready to be discharged. Dressed in clothes and given belongings.  IV removed, pt tolerated well, no complications.  Patient discharged at this time in no acute distress after pt verbalized understanding of discharge instructions.   Reviewed medications, and when to return to the ED with patient. Encouraged follow up with PCP  Patient walked to lobby, Family to take patient home.      Escuadra, Marylee, RN  05/08/24 0329

## 2024-05-28 ASSESSMENT — ENCOUNTER SYMPTOMS
ABDOMINAL DISTENTION: 0
NAUSEA: 0
SHORTNESS OF BREATH: 1
ABDOMINAL PAIN: 0
RHINORRHEA: 0
EYE REDNESS: 0
BACK PAIN: 0
EYE ITCHING: 0
WHEEZING: 0
EYE PAIN: 0
COUGH: 1
DIARRHEA: 0
SINUS PRESSURE: 0
SORE THROAT: 0
COLOR CHANGE: 0
CHEST TIGHTNESS: 0
VOMITING: 0

## 2024-05-28 NOTE — ED PROVIDER NOTES
THE [facility]  EMERGENCY DEPARTMENT ENCOUNTER          ADVANCED PRACTICE PROVIDER NOTE       Date of evaluation: 5/8/2024    Chief Complaint (from nursing documentation)     Shortness of Breath (Patient has had a \"respiratory infection\"  that has been being treated at [facility] since January, she said that she had had increased shortness of breath, increased cough she spoke to her pulmonologist and they suggested that she come to the hospital. /)      History of Present Illness     Mirna Plunkett is a 74 y.o. female who presents to the emergency department with a complaint of shortness of breath.  The patient reports that she was treated for a \"respiratory infection\" at an outside hospital back in January, but since that time, she has had intermittent episodes of increasing and worsening shortness of breath and over the last couple of weeks has had increased dyspnea with increased cough.  The patient has a history of fibrotic lungs and was scheduled for a spirometry test this week, which is scheduled for Thursday.  She reports that she has been using her daily inhalers as prescribed and minimally using her albuterol inhaler.  She reports feeling some generalized malaise and fatigue, but denies any fevers, chills, sweats or other constitutional infectious symptoms.    ASSESSMENT / PLAN  (MEDICAL DECISION MAKING)     Mirna Plunkett is admitted to the Emergency Department for evaluation of her chief complaint as described in the history of present illness.  Complete history and physical was performed by me and my attending. Nursing notes, past medical history, surgical history, family history and social history were reviewed and addressed in the HPI.    Mirna Nathan is a 74 y.o. female who presents to the emergency department with a complaint of increasing dyspnea and cough.  The patient reports that she was treated for a \"respiratory infection\" back in January at an outside hospital.  She reports that the

## 2024-05-29 ENCOUNTER — APPOINTMENT (OUTPATIENT)
Dept: GENERAL RADIOLOGY | Age: 74
DRG: 194 | End: 2024-05-29
Payer: MEDICARE

## 2024-05-29 ENCOUNTER — HOSPITAL ENCOUNTER (INPATIENT)
Age: 74
LOS: 1 days | Discharge: HOME | DRG: 194 | End: 2024-05-30
Attending: EMERGENCY MEDICINE | Admitting: INTERNAL MEDICINE
Payer: MEDICARE

## 2024-05-29 DIAGNOSIS — J18.9 RECURRENT PNEUMONIA: Primary | ICD-10-CM

## 2024-05-29 DIAGNOSIS — J47.1 BRONCHIECTASIS WITH ACUTE EXACERBATION (HCC): ICD-10-CM

## 2024-05-29 LAB
ANION GAP SERPL CALCULATED.3IONS-SCNC: 11 MMOL/L (ref 3–16)
BASE EXCESS BLDV CALC-SCNC: 3.3 MMOL/L (ref -2–3)
BASOPHILS # BLD: 0 K/UL (ref 0–0.2)
BASOPHILS NFR BLD: 0.4 %
BUN SERPL-MCNC: 16 MG/DL (ref 7–20)
CALCIUM SERPL-MCNC: 8.8 MG/DL (ref 8.3–10.6)
CHLORIDE SERPL-SCNC: 105 MMOL/L (ref 99–110)
CO2 BLDV-SCNC: 29 MMOL/L
CO2 SERPL-SCNC: 24 MMOL/L (ref 21–32)
COHGB MFR BLDV: 1.1 % (ref 0–1.5)
CREAT SERPL-MCNC: 0.7 MG/DL (ref 0.6–1.2)
DEPRECATED RDW RBC AUTO: 15.1 % (ref 12.4–15.4)
DO-HGB MFR BLDV: 54.4 %
EOSINOPHIL # BLD: 0.1 K/UL (ref 0–0.6)
EOSINOPHIL NFR BLD: 0.7 %
GFR SERPLBLD CREATININE-BSD FMLA CKD-EPI: >90 ML/MIN/{1.73_M2}
GLUCOSE SERPL-MCNC: 100 MG/DL (ref 70–99)
HCO3 BLDV-SCNC: 28 MMOL/L (ref 24–28)
HCT VFR BLD AUTO: 34.3 % (ref 36–48)
HGB BLD-MCNC: 11.2 G/DL (ref 12–16)
LACTATE BLDV-SCNC: 0.9 MMOL/L (ref 0.4–2)
LYMPHOCYTES # BLD: 1.6 K/UL (ref 1–5.1)
LYMPHOCYTES NFR BLD: 14.3 %
MCH RBC QN AUTO: 29.1 PG (ref 26–34)
MCHC RBC AUTO-ENTMCNC: 32.7 G/DL (ref 31–36)
MCV RBC AUTO: 89 FL (ref 80–100)
METHGB MFR BLDV: <0 % (ref 0–1.5)
MONOCYTES # BLD: 1.1 K/UL (ref 0–1.3)
MONOCYTES NFR BLD: 9.6 %
NEUTROPHILS # BLD: 8.6 K/UL (ref 1.7–7.7)
NEUTROPHILS NFR BLD: 75 %
PCO2 BLDV: 41.9 MMHG (ref 41–51)
PH BLDV: 7.43 [PH] (ref 7.35–7.45)
PLATELET # BLD AUTO: 247 K/UL (ref 135–450)
PMV BLD AUTO: 8 FL (ref 5–10.5)
PO2 BLDV: <30 MMHG (ref 25–40)
POTASSIUM SERPL-SCNC: 4 MMOL/L (ref 3.5–5.1)
RBC # BLD AUTO: 3.85 M/UL (ref 4–5.2)
SAO2 % BLDV: 45 %
SODIUM SERPL-SCNC: 140 MMOL/L (ref 136–145)
WBC # BLD AUTO: 11.5 K/UL (ref 4–11)

## 2024-05-29 PROCEDURE — 87641 MR-STAPH DNA AMP PROBE: CPT

## 2024-05-29 PROCEDURE — 83605 ASSAY OF LACTIC ACID: CPT

## 2024-05-29 PROCEDURE — 6360000002 HC RX W HCPCS: Performed by: EMERGENCY MEDICINE

## 2024-05-29 PROCEDURE — 6370000000 HC RX 637 (ALT 250 FOR IP): Performed by: EMERGENCY MEDICINE

## 2024-05-29 PROCEDURE — 94640 AIRWAY INHALATION TREATMENT: CPT

## 2024-05-29 PROCEDURE — 6370000000 HC RX 637 (ALT 250 FOR IP): Performed by: NURSE PRACTITIONER

## 2024-05-29 PROCEDURE — 2060000000 HC ICU INTERMEDIATE R&B

## 2024-05-29 PROCEDURE — 36415 COLL VENOUS BLD VENIPUNCTURE: CPT

## 2024-05-29 PROCEDURE — 87070 CULTURE OTHR SPECIMN AEROBIC: CPT

## 2024-05-29 PROCEDURE — 87205 SMEAR GRAM STAIN: CPT

## 2024-05-29 PROCEDURE — 96365 THER/PROPH/DIAG IV INF INIT: CPT

## 2024-05-29 PROCEDURE — 87449 NOS EACH ORGANISM AG IA: CPT

## 2024-05-29 PROCEDURE — 2580000003 HC RX 258: Performed by: INTERNAL MEDICINE

## 2024-05-29 PROCEDURE — 80048 BASIC METABOLIC PNL TOTAL CA: CPT

## 2024-05-29 PROCEDURE — 99285 EMERGENCY DEPT VISIT HI MDM: CPT

## 2024-05-29 PROCEDURE — 6360000002 HC RX W HCPCS: Performed by: INTERNAL MEDICINE

## 2024-05-29 PROCEDURE — 71046 X-RAY EXAM CHEST 2 VIEWS: CPT

## 2024-05-29 PROCEDURE — 82803 BLOOD GASES ANY COMBINATION: CPT

## 2024-05-29 PROCEDURE — 6370000000 HC RX 637 (ALT 250 FOR IP): Performed by: INTERNAL MEDICINE

## 2024-05-29 PROCEDURE — 85025 COMPLETE CBC W/AUTO DIFF WBC: CPT

## 2024-05-29 RX ORDER — HYDROCODONE BITARTRATE AND HOMATROPINE METHYLBROMIDE ORAL SOLUTION 5; 1.5 MG/5ML; MG/5ML
5 LIQUID ORAL EVERY 6 HOURS PRN
Status: DISCONTINUED | OUTPATIENT
Start: 2024-05-29 | End: 2024-05-30 | Stop reason: HOSPADM

## 2024-05-29 RX ORDER — ACETAMINOPHEN 650 MG/1
650 SUPPOSITORY RECTAL EVERY 6 HOURS PRN
Status: DISCONTINUED | OUTPATIENT
Start: 2024-05-29 | End: 2024-05-30 | Stop reason: HOSPADM

## 2024-05-29 RX ORDER — BUDESONIDE 0.5 MG/2ML
0.5 INHALANT ORAL
Status: DISCONTINUED | OUTPATIENT
Start: 2024-05-29 | End: 2024-05-30 | Stop reason: HOSPADM

## 2024-05-29 RX ORDER — ALBUTEROL SULFATE 2.5 MG/3ML
2.5 SOLUTION RESPIRATORY (INHALATION) ONCE
Status: COMPLETED | OUTPATIENT
Start: 2024-05-29 | End: 2024-05-29

## 2024-05-29 RX ORDER — SODIUM CHLORIDE 9 MG/ML
INJECTION, SOLUTION INTRAVENOUS PRN
Status: DISCONTINUED | OUTPATIENT
Start: 2024-05-29 | End: 2024-05-30 | Stop reason: HOSPADM

## 2024-05-29 RX ORDER — IPRATROPIUM BROMIDE AND ALBUTEROL SULFATE 2.5; .5 MG/3ML; MG/3ML
1 SOLUTION RESPIRATORY (INHALATION) ONCE
Status: COMPLETED | OUTPATIENT
Start: 2024-05-29 | End: 2024-05-29

## 2024-05-29 RX ORDER — SODIUM CHLORIDE 0.9 % (FLUSH) 0.9 %
5-40 SYRINGE (ML) INJECTION PRN
Status: DISCONTINUED | OUTPATIENT
Start: 2024-05-29 | End: 2024-05-30 | Stop reason: HOSPADM

## 2024-05-29 RX ORDER — ENOXAPARIN SODIUM 100 MG/ML
30 INJECTION SUBCUTANEOUS DAILY
Status: DISCONTINUED | OUTPATIENT
Start: 2024-05-29 | End: 2024-05-30 | Stop reason: HOSPADM

## 2024-05-29 RX ORDER — ONDANSETRON 2 MG/ML
4 INJECTION INTRAMUSCULAR; INTRAVENOUS EVERY 6 HOURS PRN
Status: DISCONTINUED | OUTPATIENT
Start: 2024-05-29 | End: 2024-05-30 | Stop reason: HOSPADM

## 2024-05-29 RX ORDER — LEVOFLOXACIN 5 MG/ML
750 INJECTION, SOLUTION INTRAVENOUS EVERY 24 HOURS
Status: DISCONTINUED | OUTPATIENT
Start: 2024-05-29 | End: 2024-05-30

## 2024-05-29 RX ORDER — FAMOTIDINE 20 MG/1
20 TABLET, FILM COATED ORAL 2 TIMES DAILY
Status: DISCONTINUED | OUTPATIENT
Start: 2024-05-29 | End: 2024-05-30 | Stop reason: HOSPADM

## 2024-05-29 RX ORDER — ONDANSETRON 4 MG/1
4 TABLET, ORALLY DISINTEGRATING ORAL EVERY 8 HOURS PRN
Status: DISCONTINUED | OUTPATIENT
Start: 2024-05-29 | End: 2024-05-30 | Stop reason: HOSPADM

## 2024-05-29 RX ORDER — IPRATROPIUM BROMIDE AND ALBUTEROL SULFATE 2.5; .5 MG/3ML; MG/3ML
1 SOLUTION RESPIRATORY (INHALATION) EVERY 4 HOURS PRN
Status: DISCONTINUED | OUTPATIENT
Start: 2024-05-29 | End: 2024-05-30 | Stop reason: HOSPADM

## 2024-05-29 RX ORDER — SODIUM CHLORIDE 0.9 % (FLUSH) 0.9 %
5-40 SYRINGE (ML) INJECTION EVERY 12 HOURS SCHEDULED
Status: DISCONTINUED | OUTPATIENT
Start: 2024-05-29 | End: 2024-05-30 | Stop reason: HOSPADM

## 2024-05-29 RX ORDER — ARFORMOTEROL TARTRATE 15 UG/2ML
15 SOLUTION RESPIRATORY (INHALATION)
Status: DISCONTINUED | OUTPATIENT
Start: 2024-05-29 | End: 2024-05-30 | Stop reason: HOSPADM

## 2024-05-29 RX ORDER — GUAIFENESIN 600 MG/1
600 TABLET, EXTENDED RELEASE ORAL 2 TIMES DAILY
COMMUNITY

## 2024-05-29 RX ORDER — POLYETHYLENE GLYCOL 3350 17 G/17G
17 POWDER, FOR SOLUTION ORAL DAILY PRN
Status: DISCONTINUED | OUTPATIENT
Start: 2024-05-29 | End: 2024-05-30 | Stop reason: HOSPADM

## 2024-05-29 RX ORDER — SODIUM CHLORIDE FOR INHALATION 3 %
4 VIAL, NEBULIZER (ML) INHALATION 2 TIMES DAILY
Status: DISCONTINUED | OUTPATIENT
Start: 2024-05-29 | End: 2024-05-30 | Stop reason: HOSPADM

## 2024-05-29 RX ORDER — ACETAMINOPHEN 325 MG/1
650 TABLET ORAL EVERY 6 HOURS PRN
Status: DISCONTINUED | OUTPATIENT
Start: 2024-05-29 | End: 2024-05-30 | Stop reason: HOSPADM

## 2024-05-29 RX ORDER — GUAIFENESIN 600 MG/1
600 TABLET, EXTENDED RELEASE ORAL 2 TIMES DAILY
Status: DISCONTINUED | OUTPATIENT
Start: 2024-05-29 | End: 2024-05-30 | Stop reason: HOSPADM

## 2024-05-29 RX ADMIN — LEVOFLOXACIN 750 MG: 750 INJECTION, SOLUTION INTRAVENOUS at 17:47

## 2024-05-29 RX ADMIN — FAMOTIDINE 20 MG: 20 TABLET ORAL at 21:37

## 2024-05-29 RX ADMIN — ALBUTEROL SULFATE 2.5 MG: 2.5 SOLUTION RESPIRATORY (INHALATION) at 17:42

## 2024-05-29 RX ADMIN — IPRATROPIUM BROMIDE AND ALBUTEROL SULFATE 1 DOSE: 2.5; .5 SOLUTION RESPIRATORY (INHALATION) at 17:42

## 2024-05-29 RX ADMIN — ARFORMOTEROL TARTRATE 15 MCG: 15 SOLUTION RESPIRATORY (INHALATION) at 23:25

## 2024-05-29 RX ADMIN — HYDROCODONE BITARTRATE AND HOMATROPINE METHYLBROMIDE 5 ML: 5; 1.5 SOLUTION ORAL at 23:41

## 2024-05-29 RX ADMIN — GUAIFENESIN 600 MG: 600 TABLET ORAL at 21:37

## 2024-05-29 RX ADMIN — BUDESONIDE 500 MCG: 0.5 INHALANT RESPIRATORY (INHALATION) at 23:25

## 2024-05-29 RX ADMIN — SODIUM CHLORIDE 30 MG/ML INHALATION SOLUTION 4 ML: 30 SOLUTION INHALANT at 23:25

## 2024-05-29 RX ADMIN — Medication 10 ML: at 21:39

## 2024-05-29 ASSESSMENT — PAIN SCALES - GENERAL
PAINLEVEL_OUTOF10: 0

## 2024-05-29 ASSESSMENT — PAIN - FUNCTIONAL ASSESSMENT: PAIN_FUNCTIONAL_ASSESSMENT: 0-10

## 2024-05-29 ASSESSMENT — LIFESTYLE VARIABLES
HOW OFTEN DO YOU HAVE A DRINK CONTAINING ALCOHOL: NEVER
HOW MANY STANDARD DRINKS CONTAINING ALCOHOL DO YOU HAVE ON A TYPICAL DAY: PATIENT DOES NOT DRINK

## 2024-05-29 NOTE — PLAN OF CARE
[facility] [place]  Consult received 06:05 PM  Case reviewed with ER physician    Full note to follow.    Carl Mckinney MD    Thanks  Bright Mckinnon MD

## 2024-05-29 NOTE — ED TRIAGE NOTES
[facility] Emergency Department  MEDICAL SCREENING EXAM    Date of Service: 5/29/2024    Reason for Visit: Cough (Patient had been seeing PCP for a respiratory infection since mid-January, she got a CT scan done today and PCP wants her on IV antibiotics. )        Patient History, Brief Exam, and Initial Assessment     Abbreviated HPI: Mirna Plunkett is a 74 y.o. female, generally quite healthy and active for her age, who presents to the emergency department at the behest of her primary care provider Dr. Ferirs, for IV antibiotics for recurrent multifocal pneumonia.  The patient describes that, since January, she has had several episodes of pneumonia, and has completed 4 different courses of antibiotics, most recently amoxicillin and azithromycin.  She states that she always feels a little bit better for a while after the antibiotics, but her cough, shortness of breath, and fatigue always return.  She is currently experiencing cough that is productive of green sputum.  She had a low-grade fever today to 100 °F.  She was seen in her primary care doctor's office yesterday for these symptoms, and an outpatient chest CT was performed.  This reveals \"multifocal pneumonia in the right lower lobe and right middle lobe, with narrowing of the bronchus intermedius and extensive mucous plugging versus aspiration throughout the right lower lobe amongst the pneumonia.\"    The patient states that she was called by Dr. Ferris's office today and told to come to the emergency department, because she would need to be admitted for IV antibiotics.  She states that she does not feel particularly worse today than she did yesterday, but is somewhat frustrated by her persistent and recurrent symptoms.    INITIAL VITALS: BP: 126/78, Temp: 100 °F (37.8 °C), Pulse: 97, Respirations: 18, SpO2: 100 %  Slender, generally very well-appearing patient, younger appearing than her stated age.  Normal cardiovascular exam.  Good air entry,

## 2024-05-29 NOTE — H&P
for: \"BLOODCULT2\"  Organism: No results found for: \"ORG\"    Imaging/Diagnostics Last 24 Hours   XR CHEST (2 VW)    Result Date: 5/29/2024  2 view chest HISTORY: Cough COMPARISON: May 2024 FINDINGS: Right basilar airspace disease is evident, improved from prior study. The lungs otherwise clear. There is no visible pneumothorax.     1. Improved right basilar airspace disease.      Electronically signed by Bright Mckinnon MD on 5/29/2024 at 6:06 PM

## 2024-05-29 NOTE — ED NOTES
ED TO INPATIENT SBAR HANDOFF    Patient Name: Mirna Plunkett   :  1950  74 y.o.   MRN:  [id number]  Preferred Name  Mirna  ED Room #:  SRG1/SRG1-01  Family/Caregiver Present yes   Restraints no   Sitter no   Sepsis Risk Score Sepsis Risk Score: 0.91    Situation  Code Status: No Order No additional code details.    Allergies: Flagyl [metronidazole]  Weight: Patient Vitals for the past 96 hrs (Last 3 readings):   Weight   24 1524 44.8 kg (98 lb 12.3 oz)     Arrived from: home  Chief Complaint:   Chief Complaint   Patient presents with    Cough     Patient had been seeing PCP for a respiratory infection since mid-January, she got a CT scan done today and PCP wants her on IV antibiotics.      Hospital Problem/Diagnosis:  Principal Problem:    Multifocal pneumonia  Resolved Problems:    * No resolved hospital problems. *    Imaging:   XR CHEST (2 VW)   Final Result   1. Improved right basilar airspace disease.        Abnormal labs:   Abnormal Labs Reviewed   CBC WITH AUTO DIFFERENTIAL - Abnormal; Notable for the following components:       Result Value    WBC 11.5 (*)     RBC 3.85 (*)     Hemoglobin 11.2 (*)     Hematocrit 34.3 (*)     Neutrophils Absolute 8.6 (*)     All other components within normal limits   BASIC METABOLIC PANEL W/ REFLEX TO MG FOR LOW K - Abnormal; Notable for the following components:    Glucose 100 (*)     All other components within normal limits   BLOOD GAS, VENOUS - Abnormal; Notable for the following components:    Base Excess, Tanner 3.3 (*)     All other components within normal limits     Critical values: no     Abnormal Assessment Findings: recurrent pneumonia    Background  History: No past medical history on file.    Assessment    Vitals/MEWS: MEWS Score: 1  Level of Consciousness: Alert (0)   Vitals:    24 1524   BP: 126/78   Pulse: 97   Resp: 18   Temp: 100 °F (37.8 °C)   SpO2: 100%   Weight: 44.8 kg (98 lb 12.3 oz)   Height: 1.626 m (5' 4\")     FiO2 (%):   O2 Flow Rate:

## 2024-05-29 NOTE — ED PROVIDER NOTES
0.4 %    Neutrophils Absolute 8.6 (H) 1.7 - 7.7 K/uL    Lymphocytes Absolute 1.6 1.0 - 5.1 K/uL    Monocytes Absolute 1.1 0.0 - 1.3 K/uL    Eosinophils Absolute 0.1 0.0 - 0.6 K/uL    Basophils Absolute 0.0 0.0 - 0.2 K/uL   Basic Metabolic Panel w/ Reflex to MG   Result Value Ref Range    Sodium 140 136 - 145 mmol/L    Potassium reflex Magnesium 4.0 3.5 - 5.1 mmol/L    Chloride 105 99 - 110 mmol/L    CO2 24 21 - 32 mmol/L    Anion Gap 11 3 - 16    Glucose 100 (H) 70 - 99 mg/dL    BUN 16 7 - 20 mg/dL    Creatinine 0.7 0.6 - 1.2 mg/dL    Est, Glom Filt Rate >90 >60    Calcium 8.8 8.3 - 10.6 mg/dL   Blood gas, venous (Lab)   Result Value Ref Range    pH, Tanner 7.433 7.350 - 7.450    pCO2, Tanner 41.9 41.0 - 51.0 mmHg    pO2, Tanner <30.0 25.0 - 40.0 mmHg    HCO3, Venous 28.0 24.0 - 28.0 mmol/L    Base Excess, Tanner 3.3 (H) -2.0 - 3.0 mmol/L    O2 Sat, Tanner 45 Not established %    Carboxyhemoglobin 1.1 0.0 - 1.5 %    MetHgb, Tanner <0.0 0.0 - 1.5 %    TC02 (Calc), Tanner 29 mmol/L    Hemoglobin, Tanner, Reduced 54.40 %   Lactic Acid   Result Value Ref Range    Lactic Acid 0.9 0.4 - 2.0 mmol/L     EKG       ED BEDSIDE ULTRASOUND:  No results found.    MOST RECENT VITALS:  BP: 126/78,Temp: 100 °F (37.8 °C), Pulse: 97, Respirations: 18, SpO2: 100 %     Procedures         ED Course     Nursing Notes, Past Medical Hx, Past Surgical Hx, Social Hx,Allergies, and Family Hx were reviewed.         The patient was given the following medications:  No orders of the defined types were placed in this encounter.      CONSULTS:  None    Review of Systems     Review of Systems    Please see HPI for pertinent positive and negatives. A complete review of systems was conducted and is otherwise negative unless noted.    Past Medical, Surgical, Family, and Social History     She has no past medical history on file.  She has a past surgical history that includes Mohs surgery (Right, 02/03/2020).  Her family history includes Heart Attack in her brother; Heart

## 2024-05-30 ENCOUNTER — ANESTHESIA (OUTPATIENT)
Dept: ENDOSCOPY | Age: 74
DRG: 194 | End: 2024-05-30
Payer: MEDICARE

## 2024-05-30 ENCOUNTER — ANESTHESIA EVENT (OUTPATIENT)
Dept: ENDOSCOPY | Age: 74
DRG: 194 | End: 2024-05-30
Payer: MEDICARE

## 2024-05-30 VITALS
RESPIRATION RATE: 26 BRPM | HEART RATE: 89 BPM | OXYGEN SATURATION: 96 % | BODY MASS INDEX: 17.46 KG/M2 | HEIGHT: 64 IN | DIASTOLIC BLOOD PRESSURE: 68 MMHG | SYSTOLIC BLOOD PRESSURE: 125 MMHG | WEIGHT: 102.29 LBS | TEMPERATURE: 98.2 F

## 2024-05-30 PROBLEM — J47.1 BRONCHIECTASIS WITH ACUTE EXACERBATION (HCC): Status: ACTIVE | Noted: 2024-05-30

## 2024-05-30 PROBLEM — J98.09 BRONCHOMALACIA: Status: ACTIVE | Noted: 2024-05-30

## 2024-05-30 LAB
ANION GAP SERPL CALCULATED.3IONS-SCNC: 8 MMOL/L (ref 3–16)
APPEARANCE BRONCH: ABNORMAL
APPEARANCE BRONCH: ABNORMAL
BASOPHILS # BLD: 0 K/UL (ref 0–0.2)
BASOPHILS NFR BLD: 0.7 %
BUN SERPL-MCNC: 9 MG/DL (ref 7–20)
CALCIUM SERPL-MCNC: 9 MG/DL (ref 8.3–10.6)
CHLORIDE SERPL-SCNC: 106 MMOL/L (ref 99–110)
CLOT SPEC QL: ABNORMAL
CLOT SPEC QL: ABNORMAL
CO2 SERPL-SCNC: 27 MMOL/L (ref 21–32)
COLOR BRONCH: ABNORMAL
COLOR BRONCH: COLORLESS
CREAT SERPL-MCNC: 0.6 MG/DL (ref 0.6–1.2)
DEPRECATED RDW RBC AUTO: 14.9 % (ref 12.4–15.4)
EOSINOPHIL # BLD: 0.1 K/UL (ref 0–0.6)
EOSINOPHIL NFR BLD: 1.4 %
GFR SERPLBLD CREATININE-BSD FMLA CKD-EPI: >90 ML/MIN/{1.73_M2}
GLUCOSE SERPL-MCNC: 94 MG/DL (ref 70–99)
HCT VFR BLD AUTO: 33.3 % (ref 36–48)
HGB BLD-MCNC: 11.2 G/DL (ref 12–16)
LEGIONELLA AG UR QL: NORMAL
LYMPHOCYTES # BLD: 1.3 K/UL (ref 1–5.1)
LYMPHOCYTES NFR BLD: 19.3 %
LYMPHOCYTES NFR BRONCH MANUAL: 4 % (ref 5–10)
MACROPHAGES NFR BRONCH MANUAL: 2 % (ref 90–95)
MCH RBC QN AUTO: 29.5 PG (ref 26–34)
MCHC RBC AUTO-ENTMCNC: 33.5 G/DL (ref 31–36)
MCV RBC AUTO: 88.2 FL (ref 80–100)
MONOCYTES # BLD: 0.8 K/UL (ref 0–1.3)
MONOCYTES NFR BLD: 11.7 %
MONOCYTES NFR BRONCH MANUAL: 1 %
MONOCYTES NFR BRONCH MANUAL: 5 %
MRSA DNA SPEC QL NAA+PROBE: NORMAL
NEUTROPHILS # BLD: 4.4 K/UL (ref 1.7–7.7)
NEUTROPHILS NFR BLD: 66.9 %
NEUTROPHILS NFR BRONCH MANUAL: 89 % (ref 5–10)
NEUTROPHILS NFR BRONCH MANUAL: 99 % (ref 5–10)
NUC CELL # BRONCH MANUAL: 1214 /CUMM
NUC CELL # BRONCH MANUAL: 8003 /CUMM
PLATELET # BLD AUTO: 250 K/UL (ref 135–450)
PMV BLD AUTO: 8 FL (ref 5–10.5)
POTASSIUM SERPL-SCNC: 3.9 MMOL/L (ref 3.5–5.1)
PROCALCITONIN SERPL IA-MCNC: 0.16 NG/ML (ref 0–0.15)
RBC # BLD AUTO: 3.77 M/UL (ref 4–5.2)
RBC # FLD MANUAL: 1300 /CUMM
RBC # FLD MANUAL: 3000 /CUMM
S PNEUM AG UR QL: NORMAL
SODIUM SERPL-SCNC: 141 MMOL/L (ref 136–145)
TOTAL CELLS COUNTED BRONCH: 200
TOTAL CELLS COUNTED BRONCH: 200
WBC # BLD AUTO: 6.5 K/UL (ref 4–11)

## 2024-05-30 PROCEDURE — 87633 RESP VIRUS 12-25 TARGETS: CPT

## 2024-05-30 PROCEDURE — 2500000003 HC RX 250 WO HCPCS

## 2024-05-30 PROCEDURE — 6360000002 HC RX W HCPCS

## 2024-05-30 PROCEDURE — 3609010800 HC BRONCHOSCOPY ALVEOLAR LAVAGE: Performed by: INTERNAL MEDICINE

## 2024-05-30 PROCEDURE — 87116 MYCOBACTERIA CULTURE: CPT

## 2024-05-30 PROCEDURE — 94761 N-INVAS EAR/PLS OXIMETRY MLT: CPT

## 2024-05-30 PROCEDURE — 0B9F8ZX DRAINAGE OF RIGHT LOWER LUNG LOBE, VIA NATURAL OR ARTIFICIAL OPENING ENDOSCOPIC, DIAGNOSTIC: ICD-10-PCS | Performed by: INTERNAL MEDICINE

## 2024-05-30 PROCEDURE — 87070 CULTURE OTHR SPECIMN AEROBIC: CPT

## 2024-05-30 PROCEDURE — 6370000000 HC RX 637 (ALT 250 FOR IP): Performed by: INTERNAL MEDICINE

## 2024-05-30 PROCEDURE — 84145 PROCALCITONIN (PCT): CPT

## 2024-05-30 PROCEDURE — 94640 AIRWAY INHALATION TREATMENT: CPT

## 2024-05-30 PROCEDURE — 80048 BASIC METABOLIC PNL TOTAL CA: CPT

## 2024-05-30 PROCEDURE — 7100000001 HC PACU RECOVERY - ADDTL 15 MIN: Performed by: INTERNAL MEDICINE

## 2024-05-30 PROCEDURE — 31645 BRNCHSC W/THER ASPIR 1ST: CPT | Performed by: INTERNAL MEDICINE

## 2024-05-30 PROCEDURE — 87081 CULTURE SCREEN ONLY: CPT

## 2024-05-30 PROCEDURE — 89051 BODY FLUID CELL COUNT: CPT

## 2024-05-30 PROCEDURE — 2709999900 HC NON-CHARGEABLE SUPPLY: Performed by: INTERNAL MEDICINE

## 2024-05-30 PROCEDURE — 0B9M8ZX DRAINAGE OF BILATERAL LUNGS, VIA NATURAL OR ARTIFICIAL OPENING ENDOSCOPIC, DIAGNOSTIC: ICD-10-PCS | Performed by: INTERNAL MEDICINE

## 2024-05-30 PROCEDURE — 85025 COMPLETE CBC W/AUTO DIFF WBC: CPT

## 2024-05-30 PROCEDURE — 31624 DX BRONCHOSCOPE/LAVAGE: CPT | Performed by: INTERNAL MEDICINE

## 2024-05-30 PROCEDURE — 87206 SMEAR FLUORESCENT/ACID STAI: CPT

## 2024-05-30 PROCEDURE — 7100000000 HC PACU RECOVERY - FIRST 15 MIN: Performed by: INTERNAL MEDICINE

## 2024-05-30 PROCEDURE — 3700000000 HC ANESTHESIA ATTENDED CARE: Performed by: INTERNAL MEDICINE

## 2024-05-30 PROCEDURE — 0B9C8ZX DRAINAGE OF RIGHT UPPER LUNG LOBE, VIA NATURAL OR ARTIFICIAL OPENING ENDOSCOPIC, DIAGNOSTIC: ICD-10-PCS | Performed by: INTERNAL MEDICINE

## 2024-05-30 PROCEDURE — 36415 COLL VENOUS BLD VENIPUNCTURE: CPT

## 2024-05-30 PROCEDURE — 2580000003 HC RX 258

## 2024-05-30 PROCEDURE — 0B9D8ZX DRAINAGE OF RIGHT MIDDLE LUNG LOBE, VIA NATURAL OR ARTIFICIAL OPENING ENDOSCOPIC, DIAGNOSTIC: ICD-10-PCS | Performed by: INTERNAL MEDICINE

## 2024-05-30 PROCEDURE — 31625 BRONCHOSCOPY W/BIOPSY(S): CPT | Performed by: INTERNAL MEDICINE

## 2024-05-30 PROCEDURE — 3700000001 HC ADD 15 MINUTES (ANESTHESIA): Performed by: INTERNAL MEDICINE

## 2024-05-30 PROCEDURE — 87305 ASPERGILLUS AG IA: CPT

## 2024-05-30 PROCEDURE — 99222 1ST HOSP IP/OBS MODERATE 55: CPT | Performed by: INTERNAL MEDICINE

## 2024-05-30 PROCEDURE — 87205 SMEAR GRAM STAIN: CPT

## 2024-05-30 PROCEDURE — 6370000000 HC RX 637 (ALT 250 FOR IP): Performed by: ANESTHESIOLOGY

## 2024-05-30 PROCEDURE — 6360000002 HC RX W HCPCS: Performed by: INTERNAL MEDICINE

## 2024-05-30 PROCEDURE — 87102 FUNGUS ISOLATION CULTURE: CPT

## 2024-05-30 RX ORDER — ESMOLOL HYDROCHLORIDE 10 MG/ML
INJECTION INTRAVENOUS PRN
Status: DISCONTINUED | OUTPATIENT
Start: 2024-05-30 | End: 2024-05-30 | Stop reason: SDUPTHER

## 2024-05-30 RX ORDER — IPRATROPIUM BROMIDE AND ALBUTEROL SULFATE 2.5; .5 MG/3ML; MG/3ML
1 SOLUTION RESPIRATORY (INHALATION) ONCE
Status: COMPLETED | OUTPATIENT
Start: 2024-05-30 | End: 2024-05-30

## 2024-05-30 RX ORDER — SUCCINYLCHOLINE CHLORIDE 20 MG/ML
INJECTION INTRAMUSCULAR; INTRAVENOUS PRN
Status: DISCONTINUED | OUTPATIENT
Start: 2024-05-30 | End: 2024-05-30 | Stop reason: SDUPTHER

## 2024-05-30 RX ORDER — ONDANSETRON 2 MG/ML
INJECTION INTRAMUSCULAR; INTRAVENOUS PRN
Status: DISCONTINUED | OUTPATIENT
Start: 2024-05-30 | End: 2024-05-30 | Stop reason: SDUPTHER

## 2024-05-30 RX ORDER — LEVOFLOXACIN 750 MG/1
750 TABLET, FILM COATED ORAL DAILY
Qty: 5 TABLET | Refills: 0 | Status: SHIPPED | OUTPATIENT
Start: 2024-05-31 | End: 2024-06-05

## 2024-05-30 RX ORDER — ROCURONIUM BROMIDE 10 MG/ML
INJECTION, SOLUTION INTRAVENOUS PRN
Status: DISCONTINUED | OUTPATIENT
Start: 2024-05-30 | End: 2024-05-30 | Stop reason: SDUPTHER

## 2024-05-30 RX ORDER — LIDOCAINE HYDROCHLORIDE 20 MG/ML
INJECTION, SOLUTION INTRAVENOUS PRN
Status: DISCONTINUED | OUTPATIENT
Start: 2024-05-30 | End: 2024-05-30 | Stop reason: SDUPTHER

## 2024-05-30 RX ORDER — DEXAMETHASONE SODIUM PHOSPHATE 4 MG/ML
INJECTION, SOLUTION INTRA-ARTICULAR; INTRALESIONAL; INTRAMUSCULAR; INTRAVENOUS; SOFT TISSUE PRN
Status: DISCONTINUED | OUTPATIENT
Start: 2024-05-30 | End: 2024-05-30 | Stop reason: SDUPTHER

## 2024-05-30 RX ORDER — PROPOFOL 10 MG/ML
INJECTION, EMULSION INTRAVENOUS CONTINUOUS PRN
Status: DISCONTINUED | OUTPATIENT
Start: 2024-05-30 | End: 2024-05-30 | Stop reason: SDUPTHER

## 2024-05-30 RX ORDER — SODIUM CHLORIDE, SODIUM LACTATE, POTASSIUM CHLORIDE, CALCIUM CHLORIDE 600; 310; 30; 20 MG/100ML; MG/100ML; MG/100ML; MG/100ML
INJECTION, SOLUTION INTRAVENOUS CONTINUOUS PRN
Status: DISCONTINUED | OUTPATIENT
Start: 2024-05-30 | End: 2024-05-30 | Stop reason: SDUPTHER

## 2024-05-30 RX ORDER — LEVOFLOXACIN 5 MG/ML
750 INJECTION, SOLUTION INTRAVENOUS EVERY 24 HOURS
Status: DISCONTINUED | OUTPATIENT
Start: 2024-05-30 | End: 2024-05-30 | Stop reason: HOSPADM

## 2024-05-30 RX ADMIN — ONDANSETRON 4 MG: 2 INJECTION INTRAMUSCULAR; INTRAVENOUS at 14:26

## 2024-05-30 RX ADMIN — LEVOFLOXACIN 750 MG: 750 INJECTION, SOLUTION INTRAVENOUS at 15:42

## 2024-05-30 RX ADMIN — GUAIFENESIN 600 MG: 600 TABLET ORAL at 07:57

## 2024-05-30 RX ADMIN — IPRATROPIUM BROMIDE AND ALBUTEROL SULFATE 1 DOSE: 2.5; .5 SOLUTION RESPIRATORY (INHALATION) at 15:27

## 2024-05-30 RX ADMIN — DEXAMETHASONE SODIUM PHOSPHATE 8 MG: 4 INJECTION INTRA-ARTICULAR; INTRALESIONAL; INTRAMUSCULAR; INTRAVENOUS; SOFT TISSUE at 14:30

## 2024-05-30 RX ADMIN — SODIUM CHLORIDE 30 MG/ML INHALATION SOLUTION 4 ML: 30 SOLUTION INHALANT at 10:00

## 2024-05-30 RX ADMIN — FAMOTIDINE 20 MG: 20 TABLET ORAL at 07:57

## 2024-05-30 RX ADMIN — ROCURONIUM BROMIDE 10 MG: 10 INJECTION, SOLUTION INTRAVENOUS at 14:25

## 2024-05-30 RX ADMIN — ARFORMOTEROL TARTRATE 15 MCG: 15 SOLUTION RESPIRATORY (INHALATION) at 09:45

## 2024-05-30 RX ADMIN — LIDOCAINE HYDROCHLORIDE 100 MG: 20 INJECTION, SOLUTION INTRAVENOUS at 14:20

## 2024-05-30 RX ADMIN — SUGAMMADEX 200 MG: 100 INJECTION, SOLUTION INTRAVENOUS at 14:49

## 2024-05-30 RX ADMIN — PROPOFOL 30 MG: 10 INJECTION, EMULSION INTRAVENOUS at 14:22

## 2024-05-30 RX ADMIN — SODIUM CHLORIDE, SODIUM LACTATE, POTASSIUM CHLORIDE, AND CALCIUM CHLORIDE: .6; .31; .03; .02 INJECTION, SOLUTION INTRAVENOUS at 14:18

## 2024-05-30 RX ADMIN — BUDESONIDE 500 MCG: 0.5 INHALANT RESPIRATORY (INHALATION) at 09:45

## 2024-05-30 RX ADMIN — ESMOLOL HYDROCHLORIDE 15 MG: 10 INJECTION, SOLUTION INTRAVENOUS at 14:21

## 2024-05-30 RX ADMIN — PROPOFOL 70 MG: 10 INJECTION, EMULSION INTRAVENOUS at 14:21

## 2024-05-30 RX ADMIN — SUCCINYLCHOLINE CHLORIDE 70 MG: 20 INJECTION, SOLUTION INTRAMUSCULAR; INTRAVENOUS; PARENTERAL at 14:22

## 2024-05-30 RX ADMIN — PROPOFOL 50 MCG/KG/MIN: 10 INJECTION, EMULSION INTRAVENOUS at 14:20

## 2024-05-30 ASSESSMENT — PAIN SCALES - GENERAL
PAINLEVEL_OUTOF10: 0

## 2024-05-30 ASSESSMENT — PAIN - FUNCTIONAL ASSESSMENT: PAIN_FUNCTIONAL_ASSESSMENT: NONE - DENIES PAIN

## 2024-05-30 ASSESSMENT — ENCOUNTER SYMPTOMS
SORE THROAT: 1
COUGH: 1
SHORTNESS OF BREATH: 1

## 2024-05-30 NOTE — FLOWSHEET NOTE
PACU Transfer to Floor Note    Procedure(s):  BRONCHOSCOPY ALVEOLAR LAVAGE, BRONCHOSCOPY WASH BRONCHOSCOPY WITH BIOPSY      Pt meets criteria as per Maria Fernanda Score and ASPAN Standards to transfer to next phase of care.     Verbal report given to NABIL Muir on Mirna Plunkett being transferred to [facility] for routine progression of patient care. Information from the following report(s) Nurse Handoff Report was reviewed with the receiving nurse.  Opportunity for questions and clarification was provided.      Vitals:    05/30/24 1600   BP: 125/68   Pulse: 89   Resp: 26   Temp: 98.2 °F (36.8 °C)   SpO2: 96%       In: 1086.1 [P.O.:300; I.V.:500]  Out: 200       Pt transported by NABIL Griffin  Belongings x3 taken with patient.

## 2024-05-30 NOTE — ANESTHESIA PRE PROCEDURE
Department of Anesthesiology  Preprocedure Note       Name:  Mirna Plunkett   Age:  74 y.o.  :  1950                                          MRN:  [id number]         Date:  2024      Surgeon: Surgeon(s):  Chinedu Miner MD    Procedure: Procedure(s):  BRONCHOSCOPY ALVEOLAR LAVAGE    Medications prior to admission:   Prior to Admission medications    Medication Sig Start Date End Date Taking? Authorizing Provider   guaiFENesin (MUCINEX) 600 MG extended release tablet Take 1 tablet by mouth 2 times daily   Yes Bon Parra MD   Pseudoeph-Doxylamine-DM-APAP (NYQUIL PO) Take 1 capsule by mouth at bedtime   Yes Bon Parra MD   benzonatate (TESSALON) 200 MG capsule Take 1 capsule by mouth 3 times daily as needed for Cough  Patient not taking: Reported on 2024   Jorden Vila PA   diclofenac (VOLTAREN) 75 MG EC tablet Take 1 tablet by mouth 2 times daily  Patient not taking: Reported on 2024   Bon Parra MD   famotidine (PEPCID) 20 MG tablet Take 1 tablet by mouth 2 times daily  Patient not taking: Reported on 2024   Bon Parra MD   fluticasone furoate-vilanterol (BREO ELLIPTA) 200-25 MCG/ACT AEPB inhaler Inhale 1 puff into the lungs daily 24   Bon Parra MD   Lactobacillus ([employer] ACIDOPHILUS PROBIOTIC) TABS Take 1 tablet by mouth 3 times daily 3/8/23   Bon Parra MD   Multiple Vitamins-Minerals (MULTIVITAMIN PO) Take by mouth    Bon Parra MD   Calcium-Vitamin D-Vitamin K 500-100-40 MG-UNT-MCG CHEW Take by mouth    Bon Parra MD       Current medications:    Current Facility-Administered Medications   Medication Dose Route Frequency Provider Last Rate Last Admin    levoFLOXacin (LEVAQUIN) 750 MG/150ML infusion 750 mg  750 mg IntraVENous Q24H Bright Mckinnon MD        famotidine (PEPCID) tablet 20 mg  20 mg Oral BID Bright Mckinnon MD   20 mg at 24 0757    ipratropium 0.5

## 2024-05-30 NOTE — PROGRESS NOTES
1545: PS sent to Dr Mckinnon per pt request as she was inquiring on if she needed to stay the night versus go home post bronch.   Explained that some of the progress notes discuss need for IV antibiotics but that usually bronch's are outpatient procedures.   No answer/message unread at time of transfer to PCU.   Pt brought up to PCU by writer.   Talked with pt and son at bedside regarding message to MD.   Told PCU RN that writer would contact her once response was received.     1602: PS received from Dr. Mckinnon stating pt would be d/c after he visits with her. RN informed  
Patient's morning assessment has been completed.  Patient is alert and oriented and vital signs are stable.  Patient voiced no complaints or concerns at this time.  Medications administered as ordered.  Patient's bed is in the lowest position and call light is within reach.  Will continue to monitor      
Pt admitted to room 4320. Tele applied, VSS.  Noted dc order by MD.  Pt currently getting Levaquin infusion.  Spoke with MD and okay to dc pt when infusion is complete    Electronically signed by Sandy Naik RN on 5/30/2024 at 4:41 PM     
Pt discharged home in good condition.  IV and tele removed.  AVS reviewed and all questions answered at this time.      Electronically signed by Sandy Naik RN on 5/30/2024 at 6:18 PM    
Pt sent to bronch. Received bed on pcu while there  Will be transferred to pcu from pacu  
Pulm received consult, will see.  
\"CHOL\", \"HDL\", \"TRIG\"  Hemoglobin A1C: No results found for: \"LABA1C\"  TSH: No results found for: \"TSH\"  Troponin: No results found for: \"TROPONINT\"  Lactic Acid:   Recent Labs     05/29/24  1542   LACTA 0.9     BNP: No results for input(s): \"PROBNP\" in the last 72 hours.  UA:  Lab Results   Component Value Date/Time    NITRU Negative 03/12/2023 05:53 PM    COLORU Yellow 03/12/2023 05:53 PM    PHUR 6.0 03/12/2023 05:53 PM    WBCUA 0-2 03/12/2023 05:53 PM    RBCUA 5-10 03/12/2023 05:53 PM    MUCUS Rare 03/12/2023 05:53 PM    CLARITYU Clear 03/12/2023 05:53 PM    LEUKOCYTESUR Negative 03/12/2023 05:53 PM    UROBILINOGEN 0.2 03/12/2023 05:53 PM    BILIRUBINUR Negative 03/12/2023 05:53 PM    BLOODU TRACE-INTACT 03/12/2023 05:53 PM    GLUCOSEU Negative 03/12/2023 05:53 PM    KETUA Negative 03/12/2023 05:53 PM     Urine Cultures: No results found for: \"LABURIN\"  Blood Cultures: No results found for: \"BC\"  No results found for: \"BLOODCULT2\"  Organism: No results found for: \"ORG\"      Electronically signed by Bright Mckinnon MD on 5/30/2024 at 12:13 PM

## 2024-05-30 NOTE — ANESTHESIA POSTPROCEDURE EVALUATION
Department of Anesthesiology  Postprocedure Note    Patient: Mirna Plunkett  MRN: [id number]  YOB: 1950  Date of evaluation: 5/30/2024    Procedure Summary       Date: 05/30/24 Room / Location: [place] [address] / [facility]    Anesthesia Start: 1400 Anesthesia Stop: 1503    Procedure: BRONCHOSCOPY ALVEOLAR LAVAGE, BRONCHOSCOPY WASH BRONCHOSCOPY WITH BIOPSY Diagnosis:       Bronchiectasis with acute exacerbation (HCC)      (Bronchiectasis with acute exacerbation (HCC) [J47.1])    Surgeons: Chinedu Miner MD Responsible Provider: Robin Lakhani DO    Anesthesia Type: general ASA Status: 2            Anesthesia Type: No value filed.    Maria Fernanda Phase I: Maria Fernanda Score: 10    Maria Fernanda Phase II:      Anesthesia Post Evaluation    Patient location during evaluation: PACU  Patient participation: complete - patient participated  Level of consciousness: awake and awake and alert  Pain score: 0  Airway patency: patent  Nausea & Vomiting: no nausea and no vomiting  Cardiovascular status: hemodynamically stable  Respiratory status: acceptable  Hydration status: euvolemic  Pain management: adequate and satisfactory to patient    No notable events documented.

## 2024-05-30 NOTE — CONSULTS
Pulmonology Consult Note    CC:     History Obtained From:  patient    HISTORY OF PRESENT ILLNESS:  This is a 74 year old very pleasant female with no significant PMH who presented with chronic worsening cough.  She reports being healthy until mid-January 2024, when she developed a cough which was productive of greenish sputum.  The cough did not seem to resolve with multiple courses of antibiotics including Augmentin, Amoxi-clav, and azithromycin.   She has multiple CXRs and 2 CT chest prior to this admission. She usually follows up with [facility] PCP and pulmonologist.     CT chest 4/24: Small right upper lobe infiltrate lateral segment.  Biapical fibrosis.    CT scan with contrast done at [facility] 5/28/2024: is revealing for narrowing of bronchus intermedius with extensive mucous plugging    Prior allergy workup has been negative 5/2/2024 at [facility]    Per her pulmonologist at [facility], she does need a bronchoscopy in the setting of chronic non-resolving productive cough as well needs PFTs.     She has been scheduled for multiple PFTs but she has never had one in the past.     Past Medical History:    No past medical history on file.    Past Surgical History:        Procedure Laterality Date    MOHS SURGERY Right 02/03/2020    BCC superficial right FH       Medications Priorto Admission:    Not in a hospital admission.    Allergies:  Flagyl [metronidazole]    Social History:   TOBACCO:   reports that she has never smoked. She has never used smokeless tobacco.  ETOH:   has no history on file for alcohol use.  DRUGS : none  Patient currently lives alone    Family History:       Problem Relation Age of Onset    Heart Attack Brother     Heart Disease Brother        Review of Systems   Constitutional:  Positive for fatigue.   HENT:  Positive for congestion and sore throat.    Respiratory:  Positive for cough and shortness of breath.    All other systems reviewed and are negative.      ROS: A 10 point review of

## 2024-05-30 NOTE — PROCEDURES
PROCEDURE:  BRONCHOSCOPY WITH BRONCHOALVEOLAR LAVAGE    Pre-procedure Diagnosis: bronchiectasis flare    Post-procedure Diagnosis: Tracheobronchomalacia causing recurrent pneumonias    PRE-PROCEDURE EVALUATION:    Allergies and medications have been reviewed    ASA CLASS    II. Mild systemic disease      Mallampati score:  1    Sedation plan:     Type of sedation used: General anesthesia       Post Procedure Plan   Return to same level of care     The risks and benefits as well as alternatives to the procedure have been discussed with the patient and or family.  The patient and or next of kin understands and agrees to proceed.     DESCRIPTION OF PROCEDURE: A time out was taken. Patient anesthetized by the anesthesia department    The scope was passed with ease via the ETT.  A complete airway inspection was performed below the level of the ETT.  No endobronchial lesions were identified, but her bronchus intermedius and distal portion of the right mainstem were narrowed.  Pictures included. There were thick, white secretions throughout the bilateral lower lobe airways R>L.  Therapeutic aspiration was performed.  Washings were obtained throughout the airways.  A Bronchoalveolar lavage was obtained from the RM lobe with good return.        The BI was noted to close completely with gentle suction consistent with bronchomalacia.  Because of the narrowing, and inflammation, I did perform endobronchial biopsies of the fredrick between the RUL and BI, the RML and RLL and between the superior segment of the RLL  and the rest of the RLL>    The patient tolerated the procedure well. Recovery will be in PACU.    Estimated blood loss:  5-10 mL from biopsies    FOLLOW UP:  Cultures, molecular pneumonia panel, pathology and cytology sent.    Images:    Narrowed BI/R mainstem      Narrowed BI with secretions      Mucus plugs in the Right lower lobe      Bronchomalacia of the Bronchus intermedius and RUL bronchus        Chinedu JEAN

## 2024-05-30 NOTE — FLOWSHEET NOTE
ADMISSION TO PACU     Patient: Mirna Plunkett    Procedure(s):  BRONCHOSCOPY ALVEOLAR LAVAGE, BRONCHOSCOPY WASH BRONCHOSCOPY WITH BIOPSY    Level of consciousness: awake, alert, and oriented  Nursing Assessment: weaned to RA.  Coughing constantly - difficult to obtain tele read and BP d/t productive cough.  Ice chips provided per pt request.  Denies pain.  Pt's belongings on bed.  On hospital bed. Has room on PCU - will call report to receiving RN.  1st PACU Vitals:   Vitals:    05/30/24 1503   BP: (!) 113/92   Pulse: 87   Resp: 22   Temp: 98.9 °F (37.2 °C)   SpO2: 96%       No results for input(s): \"POCGLU\" in the last 72 hours.    Lab Results   Component Value Date/Time    HGB 11.2 (L) 05/30/2024 09:21 AM     05/30/2024 09:21 AM    K 3.9 05/30/2024 09:21 AM    MG 2.40 05/08/2024 02:08 AM    CREATININE 0.6 05/30/2024 09:21 AM         Intake/Output Summary (Last 24 hours) at 5/30/2024 1507  Last data filed at 5/30/2024 1457  Gross per 24 hour   Intake 1086.06 ml   Output 200 ml   Net 886.06 ml

## 2024-05-30 NOTE — PLAN OF CARE
Problem: Respiratory - Adult  Goal: Achieves optimal ventilation and oxygenation  5/30/2024 1639 by Sandy Naik RN  Outcome: Progressing     Problem: Cardiovascular - Adult  Goal: Maintains optimal cardiac output and hemodynamic stability  5/30/2024 1639 by Sandy Naik RN  Outcome: Progressing     Problem: Infection - Adult  Goal: Absence of infection at discharge  5/30/2024 1639 by Sandy Naik RN  Outcome: Progressing     Problem: Pain  Goal: Verbalizes/displays adequate comfort level or baseline comfort level  Outcome: Progressing  Flowsheets (Taken 5/30/2024 1639)  Verbalizes/displays adequate comfort level or baseline comfort level: Encourage patient to monitor pain and request assistance  Note: Encouraged pt to notify rn of any pain      Problem: Discharge Planning  Goal: Discharge to home or other facility with appropriate resources  Outcome: Progressing  Flowsheets (Taken 5/30/2024 1639)  Discharge to home or other facility with appropriate resources: Identify barriers to discharge with patient and caregiver  Note: Pt will dc to home today

## 2024-05-30 NOTE — PLAN OF CARE
Problem: Respiratory - Adult  Goal: Achieves optimal ventilation and oxygenation  Outcome: Progressing     Problem: Cardiovascular - Adult  Goal: Maintains optimal cardiac output and hemodynamic stability  Outcome: Progressing     Problem: Infection - Adult  Goal: Absence of infection at discharge  Outcome: Progressing  Goal: Absence of infection during hospitalization  Outcome: Progressing

## 2024-05-30 NOTE — DISCHARGE SUMMARY
0-2 03/12/2023 05:53 PM    RBCUA 5-10 03/12/2023 05:53 PM    MUCUS Rare 03/12/2023 05:53 PM    CLARITYU Clear 03/12/2023 05:53 PM    LEUKOCYTESUR Negative 03/12/2023 05:53 PM    UROBILINOGEN 0.2 03/12/2023 05:53 PM    BILIRUBINUR Negative 03/12/2023 05:53 PM    BLOODU TRACE-INTACT 03/12/2023 05:53 PM    GLUCOSEU Negative 03/12/2023 05:53 PM    KETUA Negative 03/12/2023 05:53 PM     Urine Cultures: No results found for: \"LABURIN\"  Blood Cultures: No results found for: \"BC\"  No results found for: \"BLOODCULT2\"  Organism: No results found for: \"ORG\"    Time Spent Discharging patient *** minutes    Electronically signed by Bright Mckinnon MD on 5/30/2024 at 3:54 PM

## 2024-05-31 LAB
ACID FAST STN SPEC QL: NORMAL
ACID FAST STN SPEC QL: NORMAL
BACTERIA SPEC RESP CULT: NORMAL
GRAM STN SPEC: NORMAL
LOEFFLER MB STN SPEC: NORMAL
LOEFFLER MB STN SPEC: NORMAL
ORGANISM: ABNORMAL
ORGANISM: ABNORMAL
REASON FOR REJECTION: NORMAL
REJECTED TEST: NORMAL
REPORT: NORMAL
RESP PATH DNA+RNA PNL L RESP NAA+NON-PRB: ABNORMAL
RESP PATH DNA+RNA PNL L RESP NAA+NON-PRB: ABNORMAL

## 2024-06-01 LAB
BACTERIA SPEC RESP CULT: NORMAL
BACTERIA SPEC RESP CULT: NORMAL
GRAM STN SPEC: NORMAL
GRAM STN SPEC: NORMAL

## 2024-06-02 LAB
ASPERGILLUS GALACTO AG: NEGATIVE
ASPERGILLUS GALACTO AG: NEGATIVE
GALACTOMANNAN AG SERPL IA-ACNC: 0.06
GALACTOMANNAN AG SERPL IA-ACNC: 0.08
PRELIMINARY: NORMAL
PRELIMINARY: NORMAL

## 2024-06-03 ENCOUNTER — TELEPHONE (OUTPATIENT)
Dept: PULMONOLOGY | Age: 74
End: 2024-06-03

## 2024-06-03 LAB
FUNGUS SPEC CULT: NORMAL
LOEFFLER MB STN SPEC: NORMAL

## 2024-06-03 NOTE — TELEPHONE ENCOUNTER
She states she is to make appt for Bronch f/u in one week.  I actually scheduled her for June 12, at 11:00, if you need to see her sooner could you please let me know where to put her at. Thanks.

## 2024-06-03 NOTE — TELEPHONE ENCOUNTER
The 12th would be fine, but I think I had a cancellation on Wednesday morning.  If I'm correct she could plug in there I suppose.

## 2024-06-05 ENCOUNTER — OFFICE VISIT (OUTPATIENT)
Dept: PULMONOLOGY | Age: 74
End: 2024-06-05
Payer: MEDICARE

## 2024-06-05 VITALS
SYSTOLIC BLOOD PRESSURE: 114 MMHG | OXYGEN SATURATION: 98 % | HEIGHT: 64 IN | WEIGHT: 97.6 LBS | BODY MASS INDEX: 16.66 KG/M2 | DIASTOLIC BLOOD PRESSURE: 66 MMHG | HEART RATE: 89 BPM

## 2024-06-05 DIAGNOSIS — J98.09 BRONCHOMALACIA, ACQUIRED: ICD-10-CM

## 2024-06-05 DIAGNOSIS — J47.1 BRONCHIECTASIS WITH ACUTE EXACERBATION (HCC): Primary | ICD-10-CM

## 2024-06-05 DIAGNOSIS — J47.1 BRONCHIECTASIS WITH ACUTE EXACERBATION (HCC): ICD-10-CM

## 2024-06-05 LAB — RHEUMATOID FACT SER IA-ACNC: 11 IU/ML

## 2024-06-05 PROCEDURE — G8419 CALC BMI OUT NRM PARAM NOF/U: HCPCS | Performed by: INTERNAL MEDICINE

## 2024-06-05 PROCEDURE — 99215 OFFICE O/P EST HI 40 MIN: CPT | Performed by: INTERNAL MEDICINE

## 2024-06-05 PROCEDURE — 1111F DSCHRG MED/CURRENT MED MERGE: CPT | Performed by: INTERNAL MEDICINE

## 2024-06-05 PROCEDURE — G8428 CUR MEDS NOT DOCUMENT: HCPCS | Performed by: INTERNAL MEDICINE

## 2024-06-05 PROCEDURE — 1090F PRES/ABSN URINE INCON ASSESS: CPT | Performed by: INTERNAL MEDICINE

## 2024-06-05 PROCEDURE — 1123F ACP DISCUSS/DSCN MKR DOCD: CPT | Performed by: INTERNAL MEDICINE

## 2024-06-05 PROCEDURE — 3017F COLORECTAL CA SCREEN DOC REV: CPT | Performed by: INTERNAL MEDICINE

## 2024-06-05 PROCEDURE — G8400 PT W/DXA NO RESULTS DOC: HCPCS | Performed by: INTERNAL MEDICINE

## 2024-06-05 PROCEDURE — 1036F TOBACCO NON-USER: CPT | Performed by: INTERNAL MEDICINE

## 2024-06-05 RX ORDER — BUDESONIDE AND FORMOTEROL FUMARATE DIHYDRATE 80; 4.5 UG/1; UG/1
2 AEROSOL RESPIRATORY (INHALATION) 2 TIMES DAILY
COMMUNITY

## 2024-06-05 RX ORDER — SODIUM CHLORIDE FOR INHALATION 3 %
4 VIAL, NEBULIZER (ML) INHALATION 3 TIMES DAILY
Qty: 1350 ML | Refills: 2 | Status: SHIPPED | OUTPATIENT
Start: 2024-06-05

## 2024-06-05 RX ORDER — ALBUTEROL SULFATE 90 UG/1
2 AEROSOL, METERED RESPIRATORY (INHALATION) EVERY 6 HOURS PRN
COMMUNITY

## 2024-06-05 RX ORDER — ALBUTEROL SULFATE 1.25 MG/3ML
1 SOLUTION RESPIRATORY (INHALATION) 3 TIMES DAILY
Qty: 90 EACH | Refills: 3 | Status: SHIPPED | OUTPATIENT
Start: 2024-06-05

## 2024-06-05 NOTE — PROGRESS NOTES
[facility]    Outpatient Follow Up Note    Subjective:   CHIEF COMPLAINT / HPI:     The patient is 74 y.o. female who presents today for hospital discharge follow-up for recurrent pneumonia and bronchiectasis.  Patient has had a recalcitrant cough for the past 5 months and has been on multiple rounds of antibiotics.  She was sent to the emergency room last week because she had a CT scan that showed new infiltrates in her right lower lobe compared to what was present in April on CT.  However she also had a chest x-ray on the eighth that showed significant right lower lobe airspace disease and the x-ray in the emergency room showed persistence but significant improvement of the airspace disease.  She had a bronchoscopy last week with some images included below.  The PCR came back positive for haemophilus influenza and rhinovirus.  She was discharged on Levaquin.  Initially she felt much better but she has gradually started feeling worse again.  Yesterday was her first day off Levaquin.  She still has this nagging cough productive of white phlegm.  In addition she says she has sinus congestion in the front of her forehead under her eyes, and occasionally feels dizzy with changes in position.          Past Medical History:    No past medical history on file.    Social History:    Social History     Tobacco Use   Smoking Status Never   Smokeless Tobacco Never       Current Medications:  Current Outpatient Medications on File Prior to Visit   Medication Sig Dispense Refill    budesonide-formoterol (SYMBICORT) 80-4.5 MCG/ACT AERO Inhale 2 puffs into the lungs 2 times daily      albuterol sulfate HFA (VENTOLIN HFA) 108 (90 Base) MCG/ACT inhaler Inhale 2 puffs into the lungs every 6 hours as needed for Wheezing      guaiFENesin (MUCINEX) 600 MG extended release tablet Take 1 tablet by mouth 2 times daily as needed      Pseudoeph-Doxylamine-DM-APAP (NYQUIL PO) Take 1 capsule by mouth at

## 2024-06-06 ENCOUNTER — TELEPHONE (OUTPATIENT)
Dept: ADMINISTRATIVE | Age: 74
End: 2024-06-06

## 2024-06-06 LAB — ANA SER QL IA: NEGATIVE

## 2024-06-06 NOTE — TELEPHONE ENCOUNTER
Submitted PA for Albuterol Sulfate 1.25MG/3ML nebulizer solution   Via CMM Key: [id number]  STATUS: PENDING.    Follow up done daily; if no decision with in three days we will refax.  If another three days goes by with no decision will call the insurance for status.

## 2024-06-07 NOTE — TELEPHONE ENCOUNTER
Medicare plan D will not cover Albuterol Solution. Please have insurance/pharmacy bill [employer] Plan B  Thank you

## 2024-06-08 LAB
FINAL REPORT: NORMAL
PRELIMINARY: NORMAL

## 2024-06-10 ENCOUNTER — TELEPHONE (OUTPATIENT)
Dept: PULMONOLOGY | Age: 74
End: 2024-06-10

## 2024-06-10 LAB
FUNGUS SPEC CULT: NORMAL
LOEFFLER MB STN SPEC: NORMAL

## 2024-06-10 NOTE — TELEPHONE ENCOUNTER
Spoke to Mirna.  The only new labs were the autoimmune ones I ordered and they were normal.  Still doing mcgill with insurance over chest vest and hypertonic saline.  Apparently, as a board certified pulmonologist I am not qualified to diagnose a patient with bronchiectasis.  It has to be in a radiology report for medicare to consider it valid.

## 2024-06-10 NOTE — TELEPHONE ENCOUNTER
Mirna would like a comment on results of labs that were done on 06/05/2024.  She also says that saline sol'n for nebulizer was not covered by insur and will cost approx $150.00  She can be reached at [phone]

## 2024-06-11 LAB
ACID FAST STN SPEC QL: NORMAL
ACID FAST STN SPEC QL: NORMAL
MYCOBACTERIUM SPEC CULT: NORMAL
MYCOBACTERIUM SPEC CULT: NORMAL

## 2024-06-14 ENCOUNTER — TELEPHONE (OUTPATIENT)
Dept: PULMONOLOGY | Age: 74
End: 2024-06-14

## 2024-06-14 NOTE — TELEPHONE ENCOUNTER
Mariah from [employer] called with a follow up for pt's vest. It has been approved and should be delivered sometime next week.

## 2024-06-14 NOTE — TELEPHONE ENCOUNTER
Sodium chloride is not covered by insurance   She said she is doing fine with albuterol neb solution, as well as albuterol inhaler and Symbicort inhalers.     Wants to know if you can send alternative for sodium chloride or she even need it if doing well with albuterol?

## 2024-06-14 NOTE — TELEPHONE ENCOUNTER
Mirna inquires about the results of PFT tests done at [facility] recently. Medical Records were requested to be faxed ([phone]).   See scan in Media.  Her next appt with Dr. Miner is scheduled for 09/18/2024.    Mirna can be reached at [phone]

## 2024-06-17 LAB
FUNGUS SPEC CULT: NORMAL
LOEFFLER MB STN SPEC: NORMAL

## 2024-06-24 LAB
FUNGUS SPEC CULT: NORMAL
LOEFFLER MB STN SPEC: NORMAL

## 2024-06-28 ENCOUNTER — TELEPHONE (OUTPATIENT)
Dept: PULMONOLOGY | Age: 74
End: 2024-06-28

## 2024-06-28 NOTE — TELEPHONE ENCOUNTER
Patient called to see if she needs to repeat her CT scan she has scheduled for July 8?  She had a CT the end of May and has a follow up with you in September

## 2024-06-28 NOTE — TELEPHONE ENCOUNTER
Yes to the CT.  The purpose is to make sure that she has finally cleared the infection/airspace disease on the CT from late May.

## 2024-07-01 LAB
FUNGUS SPEC CULT: NORMAL
LOEFFLER MB STN SPEC: NORMAL

## 2024-07-08 LAB
FUNGUS SPEC CULT: ABNORMAL
LOEFFLER MB STN SPEC: ABNORMAL
ORGANISM: ABNORMAL

## 2024-07-18 ENCOUNTER — TELEPHONE (OUTPATIENT)
Dept: ADMINISTRATIVE | Age: 74
End: 2024-07-18

## 2024-09-18 ENCOUNTER — OFFICE VISIT (OUTPATIENT)
Dept: PULMONOLOGY | Age: 74
End: 2024-09-18
Payer: MEDICARE

## 2024-09-18 VITALS
HEART RATE: 83 BPM | DIASTOLIC BLOOD PRESSURE: 64 MMHG | BODY MASS INDEX: 17.55 KG/M2 | SYSTOLIC BLOOD PRESSURE: 108 MMHG | WEIGHT: 102.8 LBS | OXYGEN SATURATION: 98 % | RESPIRATION RATE: 16 BRPM | HEIGHT: 64 IN

## 2024-09-18 DIAGNOSIS — J98.09 BRONCHOMALACIA, ACQUIRED: ICD-10-CM

## 2024-09-18 DIAGNOSIS — J47.1 BRONCHIECTASIS WITH ACUTE EXACERBATION (HCC): Primary | ICD-10-CM

## 2024-09-18 PROCEDURE — 1123F ACP DISCUSS/DSCN MKR DOCD: CPT | Performed by: INTERNAL MEDICINE

## 2024-09-18 PROCEDURE — G8419 CALC BMI OUT NRM PARAM NOF/U: HCPCS | Performed by: INTERNAL MEDICINE

## 2024-09-18 PROCEDURE — 3017F COLORECTAL CA SCREEN DOC REV: CPT | Performed by: INTERNAL MEDICINE

## 2024-09-18 PROCEDURE — 99213 OFFICE O/P EST LOW 20 MIN: CPT | Performed by: INTERNAL MEDICINE

## 2024-09-18 PROCEDURE — 1036F TOBACCO NON-USER: CPT | Performed by: INTERNAL MEDICINE

## 2024-09-18 PROCEDURE — G8427 DOCREV CUR MEDS BY ELIG CLIN: HCPCS | Performed by: INTERNAL MEDICINE

## 2024-09-18 PROCEDURE — 1090F PRES/ABSN URINE INCON ASSESS: CPT | Performed by: INTERNAL MEDICINE

## 2024-09-18 PROCEDURE — G8400 PT W/DXA NO RESULTS DOC: HCPCS | Performed by: INTERNAL MEDICINE

## (undated) DEVICE — TRAP,MUCUS SPECIMEN, 80CC: Brand: MEDLINE

## (undated) DEVICE — SINGLE-USE BIOPSY FORCEPS: Brand: RADIAL JAW 4

## (undated) DEVICE — Z DISCONTINUED USE 2749457 TUBING SAMP AD W12.5XH8.4IN D9.1IN NSL ORAL SMRT CAPNOLINE